# Patient Record
Sex: FEMALE | Race: WHITE | Employment: UNEMPLOYED | URBAN - METROPOLITAN AREA
[De-identification: names, ages, dates, MRNs, and addresses within clinical notes are randomized per-mention and may not be internally consistent; named-entity substitution may affect disease eponyms.]

---

## 2017-04-28 ENCOUNTER — HOSPITAL ENCOUNTER (OUTPATIENT)
Dept: INFUSION THERAPY | Age: 82
Discharge: HOME | End: 2017-04-28
Payer: MEDICARE

## 2017-04-28 VITALS
WEIGHT: 151 LBS | TEMPERATURE: 97.1 F | SYSTOLIC BLOOD PRESSURE: 107 MMHG | BODY MASS INDEX: 25.13 KG/M2 | DIASTOLIC BLOOD PRESSURE: 58 MMHG | RESPIRATION RATE: 18 BRPM | HEART RATE: 64 BPM

## 2017-04-28 PROCEDURE — 96365 THER/PROPH/DIAG IV INF INIT: CPT

## 2017-04-28 PROCEDURE — 74011000250 HC RX REV CODE- 250: Performed by: INTERNAL MEDICINE

## 2017-04-28 PROCEDURE — 96366 THER/PROPH/DIAG IV INF ADDON: CPT

## 2017-04-28 PROCEDURE — 74011000258 HC RX REV CODE- 258: Performed by: INTERNAL MEDICINE

## 2017-04-28 RX ADMIN — SODIUM BICARBONATE: 84 INJECTION, SOLUTION INTRAVENOUS at 11:41

## 2017-04-28 RX ADMIN — SODIUM BICARBONATE: 84 INJECTION, SOLUTION INTRAVENOUS at 13:08

## 2017-04-28 NOTE — PROGRESS NOTES
Problem: Knowledge Deficit  Goal: *Verbalizes understanding of procedures and medications  Outcome: Resolved/Met Date Met:  04/28/17  Process

## 2017-04-28 NOTE — PROGRESS NOTES
[facility] Note:  1100Pt arrived at [facility] ambulatory and in no distress for hydration over 4 hr. Assessment stable, no  complaints voiced. Medications received:  *1/2 NS with 75 meq sodium bicarb    1600 Tolerated treatment well, no adverse reaction noted. D/Cd from [facility] ambulatory and in no distress accompanied by daughter. Next appt none  Visit Vitals    /58    Pulse 64    Temp 97.1 °F (36.2 °C)    Resp 18    Wt 68.5 kg (151 lb)    BMI 25.13 kg/m2     No results found for this or any previous visit (from the past 12 hour(s)).

## 2018-07-09 ENCOUNTER — APPOINTMENT (OUTPATIENT)
Dept: ULTRASOUND IMAGING | Age: 83
DRG: 445 | End: 2018-07-09
Attending: EMERGENCY MEDICINE
Payer: MEDICARE

## 2018-07-09 ENCOUNTER — HOSPITAL ENCOUNTER (INPATIENT)
Age: 83
LOS: 1 days | Discharge: HOME | DRG: 445 | End: 2018-07-10
Attending: EMERGENCY MEDICINE | Admitting: INTERNAL MEDICINE
Payer: MEDICARE

## 2018-07-09 ENCOUNTER — APPOINTMENT (OUTPATIENT)
Dept: MRI IMAGING | Age: 83
DRG: 445 | End: 2018-07-09
Attending: INTERNAL MEDICINE
Payer: MEDICARE

## 2018-07-09 ENCOUNTER — APPOINTMENT (OUTPATIENT)
Dept: GENERAL RADIOLOGY | Age: 83
DRG: 445 | End: 2018-07-09
Attending: EMERGENCY MEDICINE
Payer: MEDICARE

## 2018-07-09 ENCOUNTER — APPOINTMENT (OUTPATIENT)
Dept: CT IMAGING | Age: 83
DRG: 445 | End: 2018-07-09
Attending: INTERNAL MEDICINE
Payer: MEDICARE

## 2018-07-09 DIAGNOSIS — R11.2 NON-INTRACTABLE VOMITING WITH NAUSEA, UNSPECIFIED VOMITING TYPE: Primary | ICD-10-CM

## 2018-07-09 DIAGNOSIS — R74.02 ELEVATED SERUM LACTATE DEHYDROGENASE: ICD-10-CM

## 2018-07-09 DIAGNOSIS — R68.83 CHILLS (WITHOUT FEVER): ICD-10-CM

## 2018-07-09 PROBLEM — K83.8 DILATION OF BILIARY TRACT: Status: ACTIVE | Noted: 2018-07-09

## 2018-07-09 LAB
ALBUMIN SERPL-MCNC: 3.6 G/DL (ref 3.5–5)
ALBUMIN/GLOB SERPL: 0.9 {RATIO} (ref 1.1–2.2)
ALP SERPL-CCNC: 201 U/L (ref 45–117)
ALT SERPL-CCNC: 71 U/L (ref 12–78)
ANION GAP SERPL CALC-SCNC: 11 MMOL/L (ref 5–15)
APPEARANCE UR: CLEAR
AST SERPL-CCNC: 54 U/L (ref 15–37)
BACTERIA URNS QL MICRO: NEGATIVE /HPF
BASOPHILS # BLD: 0.1 K/UL (ref 0–0.1)
BASOPHILS NFR BLD: 1 % (ref 0–1)
BILIRUB SERPL-MCNC: 0.5 MG/DL (ref 0.2–1)
BILIRUB UR QL: NEGATIVE
BUN SERPL-MCNC: 21 MG/DL (ref 6–20)
BUN/CREAT SERPL: 14 (ref 12–20)
CALCIUM SERPL-MCNC: 9.2 MG/DL (ref 8.5–10.1)
CHLORIDE SERPL-SCNC: 108 MMOL/L (ref 97–108)
CO2 SERPL-SCNC: 20 MMOL/L (ref 21–32)
COLOR UR: ABNORMAL
CREAT SERPL-MCNC: 1.55 MG/DL (ref 0.55–1.02)
DIFFERENTIAL METHOD BLD: ABNORMAL
EOSINOPHIL # BLD: 0.1 K/UL (ref 0–0.4)
EOSINOPHIL NFR BLD: 2 % (ref 0–7)
EPITH CASTS URNS QL MICRO: ABNORMAL /LPF
ERYTHROCYTE [DISTWIDTH] IN BLOOD BY AUTOMATED COUNT: 12.9 % (ref 11.5–14.5)
GLOBULIN SER CALC-MCNC: 4.2 G/DL (ref 2–4)
GLUCOSE SERPL-MCNC: 150 MG/DL (ref 65–100)
GLUCOSE UR STRIP.AUTO-MCNC: 250 MG/DL
HCT VFR BLD AUTO: 42.1 % (ref 35–47)
HGB BLD-MCNC: 13.8 G/DL (ref 11.5–16)
HGB UR QL STRIP: ABNORMAL
IMM GRANULOCYTES # BLD: 0 K/UL (ref 0–0.04)
IMM GRANULOCYTES NFR BLD AUTO: 0 % (ref 0–0.5)
KETONES UR QL STRIP.AUTO: NEGATIVE MG/DL
LACTATE SERPL-SCNC: 1.8 MMOL/L (ref 0.4–2)
LACTATE SERPL-SCNC: 2.8 MMOL/L (ref 0.4–2)
LEUKOCYTE ESTERASE UR QL STRIP.AUTO: NEGATIVE
LIPASE SERPL-CCNC: 247 U/L (ref 73–393)
LYMPHOCYTES # BLD: 0.8 K/UL (ref 0.8–3.5)
LYMPHOCYTES NFR BLD: 12 % (ref 12–49)
MCH RBC QN AUTO: 32.9 PG (ref 26–34)
MCHC RBC AUTO-ENTMCNC: 32.8 G/DL (ref 30–36.5)
MCV RBC AUTO: 100.2 FL (ref 80–99)
MONOCYTES # BLD: 0.5 K/UL (ref 0–1)
MONOCYTES NFR BLD: 7 % (ref 5–13)
NEUTS SEG # BLD: 5.2 K/UL (ref 1.8–8)
NEUTS SEG NFR BLD: 78 % (ref 32–75)
NITRITE UR QL STRIP.AUTO: NEGATIVE
NRBC # BLD: 0 K/UL (ref 0–0.01)
NRBC BLD-RTO: 0 PER 100 WBC
PH UR STRIP: 5.5 [PH] (ref 5–8)
PLATELET # BLD AUTO: 216 K/UL (ref 150–400)
PMV BLD AUTO: 10 FL (ref 8.9–12.9)
POTASSIUM SERPL-SCNC: 4.2 MMOL/L (ref 3.5–5.1)
PROT SERPL-MCNC: 7.8 G/DL (ref 6.4–8.2)
PROT UR STRIP-MCNC: 100 MG/DL
RBC # BLD AUTO: 4.2 M/UL (ref 3.8–5.2)
RBC #/AREA URNS HPF: ABNORMAL /HPF (ref 0–5)
SODIUM SERPL-SCNC: 139 MMOL/L (ref 136–145)
SP GR UR REFRACTOMETRY: 1.01 (ref 1–1.03)
TROPONIN I SERPL-MCNC: <0.05 NG/ML
UA: UC IF INDICATED,UAUC: ABNORMAL
UROBILINOGEN UR QL STRIP.AUTO: 0.2 EU/DL (ref 0.2–1)
WBC # BLD AUTO: 6.7 K/UL (ref 3.6–11)
WBC URNS QL MICRO: ABNORMAL /HPF (ref 0–4)

## 2018-07-09 PROCEDURE — 36415 COLL VENOUS BLD VENIPUNCTURE: CPT | Performed by: EMERGENCY MEDICINE

## 2018-07-09 PROCEDURE — 65270000032 HC RM SEMIPRIVATE

## 2018-07-09 PROCEDURE — 76705 ECHO EXAM OF ABDOMEN: CPT

## 2018-07-09 PROCEDURE — 81001 URINALYSIS AUTO W/SCOPE: CPT | Performed by: EMERGENCY MEDICINE

## 2018-07-09 PROCEDURE — 99285 EMERGENCY DEPT VISIT HI MDM: CPT

## 2018-07-09 PROCEDURE — 96374 THER/PROPH/DIAG INJ IV PUSH: CPT

## 2018-07-09 PROCEDURE — 83690 ASSAY OF LIPASE: CPT | Performed by: EMERGENCY MEDICINE

## 2018-07-09 PROCEDURE — 85025 COMPLETE CBC W/AUTO DIFF WBC: CPT | Performed by: EMERGENCY MEDICINE

## 2018-07-09 PROCEDURE — 87086 URINE CULTURE/COLONY COUNT: CPT | Performed by: EMERGENCY MEDICINE

## 2018-07-09 PROCEDURE — 87040 BLOOD CULTURE FOR BACTERIA: CPT | Performed by: EMERGENCY MEDICINE

## 2018-07-09 PROCEDURE — 93005 ELECTROCARDIOGRAM TRACING: CPT

## 2018-07-09 PROCEDURE — 83605 ASSAY OF LACTIC ACID: CPT | Performed by: EMERGENCY MEDICINE

## 2018-07-09 PROCEDURE — 74011250637 HC RX REV CODE- 250/637: Performed by: INTERNAL MEDICINE

## 2018-07-09 PROCEDURE — 74176 CT ABD & PELVIS W/O CONTRAST: CPT

## 2018-07-09 PROCEDURE — 74011250636 HC RX REV CODE- 250/636: Performed by: EMERGENCY MEDICINE

## 2018-07-09 PROCEDURE — 84484 ASSAY OF TROPONIN QUANT: CPT | Performed by: EMERGENCY MEDICINE

## 2018-07-09 PROCEDURE — 96361 HYDRATE IV INFUSION ADD-ON: CPT

## 2018-07-09 PROCEDURE — 71046 X-RAY EXAM CHEST 2 VIEWS: CPT

## 2018-07-09 PROCEDURE — 80053 COMPREHEN METABOLIC PANEL: CPT | Performed by: EMERGENCY MEDICINE

## 2018-07-09 RX ORDER — ONDANSETRON 2 MG/ML
4 INJECTION INTRAMUSCULAR; INTRAVENOUS
Status: COMPLETED | OUTPATIENT
Start: 2018-07-09 | End: 2018-07-09

## 2018-07-09 RX ORDER — LOPERAMIDE HYDROCHLORIDE 2 MG/1
2 CAPSULE ORAL AS NEEDED
Status: DISCONTINUED | OUTPATIENT
Start: 2018-07-09 | End: 2018-07-10 | Stop reason: HOSPADM

## 2018-07-09 RX ORDER — PANTOPRAZOLE SODIUM 40 MG/1
40 TABLET, DELAYED RELEASE ORAL
Status: DISCONTINUED | OUTPATIENT
Start: 2018-07-10 | End: 2018-07-10 | Stop reason: HOSPADM

## 2018-07-09 RX ORDER — ENOXAPARIN SODIUM 100 MG/ML
40 INJECTION SUBCUTANEOUS EVERY 24 HOURS
Status: DISCONTINUED | OUTPATIENT
Start: 2018-07-09 | End: 2018-07-09 | Stop reason: DRUGHIGH

## 2018-07-09 RX ORDER — CETIRIZINE HCL 10 MG
10 TABLET ORAL DAILY
COMMUNITY

## 2018-07-09 RX ORDER — SODIUM CHLORIDE 9 MG/ML
500 INJECTION, SOLUTION INTRAVENOUS CONTINUOUS
Status: DISCONTINUED | OUTPATIENT
Start: 2018-07-09 | End: 2018-07-09

## 2018-07-09 RX ORDER — AMLODIPINE BESYLATE 5 MG/1
5 TABLET ORAL DAILY
Status: DISCONTINUED | OUTPATIENT
Start: 2018-07-10 | End: 2018-07-10 | Stop reason: HOSPADM

## 2018-07-09 RX ORDER — CYCLOBENZAPRINE HCL 10 MG
5 TABLET ORAL
Status: DISCONTINUED | OUTPATIENT
Start: 2018-07-09 | End: 2018-07-10 | Stop reason: HOSPADM

## 2018-07-09 RX ORDER — THERA TABS 400 MCG
1 TAB ORAL DAILY
Status: DISCONTINUED | OUTPATIENT
Start: 2018-07-10 | End: 2018-07-10 | Stop reason: HOSPADM

## 2018-07-09 RX ORDER — MEMANTINE HYDROCHLORIDE 10 MG/1
10 TABLET ORAL 2 TIMES DAILY
Status: DISCONTINUED | OUTPATIENT
Start: 2018-07-09 | End: 2018-07-10 | Stop reason: HOSPADM

## 2018-07-09 RX ORDER — LOPERAMIDE HYDROCHLORIDE 2 MG/1
2 CAPSULE ORAL AS NEEDED
COMMUNITY

## 2018-07-09 RX ORDER — ENOXAPARIN SODIUM 100 MG/ML
30 INJECTION SUBCUTANEOUS EVERY 24 HOURS
Status: DISCONTINUED | OUTPATIENT
Start: 2018-07-09 | End: 2018-07-10 | Stop reason: HOSPADM

## 2018-07-09 RX ORDER — LEVOTHYROXINE SODIUM 88 UG/1
88 TABLET ORAL
Status: DISCONTINUED | OUTPATIENT
Start: 2018-07-10 | End: 2018-07-10 | Stop reason: HOSPADM

## 2018-07-09 RX ORDER — ACETAMINOPHEN 325 MG/1
650 TABLET ORAL
Status: DISCONTINUED | OUTPATIENT
Start: 2018-07-09 | End: 2018-07-10 | Stop reason: HOSPADM

## 2018-07-09 RX ORDER — OMEPRAZOLE 20 MG/1
20 CAPSULE, DELAYED RELEASE ORAL DAILY
Status: DISCONTINUED | OUTPATIENT
Start: 2018-07-10 | End: 2018-07-09 | Stop reason: CLARIF

## 2018-07-09 RX ORDER — CLONIDINE HYDROCHLORIDE 0.1 MG/1
0.1 TABLET ORAL
Status: DISCONTINUED | OUTPATIENT
Start: 2018-07-09 | End: 2018-07-10 | Stop reason: HOSPADM

## 2018-07-09 RX ORDER — ONDANSETRON 2 MG/ML
4 INJECTION INTRAMUSCULAR; INTRAVENOUS
Status: DISCONTINUED | OUTPATIENT
Start: 2018-07-09 | End: 2018-07-10 | Stop reason: HOSPADM

## 2018-07-09 RX ORDER — DONEPEZIL HYDROCHLORIDE 10 MG/1
10 TABLET, FILM COATED ORAL
Status: DISCONTINUED | OUTPATIENT
Start: 2018-07-09 | End: 2018-07-10 | Stop reason: HOSPADM

## 2018-07-09 RX ORDER — FEBUXOSTAT 40 MG/1
40 TABLET, FILM COATED ORAL DAILY
Status: DISCONTINUED | OUTPATIENT
Start: 2018-07-10 | End: 2018-07-10 | Stop reason: HOSPADM

## 2018-07-09 RX ORDER — SODIUM CHLORIDE 9 MG/ML
75 INJECTION, SOLUTION INTRAVENOUS CONTINUOUS
Status: DISCONTINUED | OUTPATIENT
Start: 2018-07-09 | End: 2018-07-10 | Stop reason: HOSPADM

## 2018-07-09 RX ORDER — CETIRIZINE HCL 10 MG
10 TABLET ORAL DAILY
Status: DISCONTINUED | OUTPATIENT
Start: 2018-07-10 | End: 2018-07-10 | Stop reason: HOSPADM

## 2018-07-09 RX ADMIN — DONEPEZIL HYDROCHLORIDE 10 MG: 10 TABLET, FILM COATED ORAL at 21:23

## 2018-07-09 RX ADMIN — ONDANSETRON 4 MG: 2 INJECTION INTRAMUSCULAR; INTRAVENOUS at 08:57

## 2018-07-09 RX ADMIN — CLONIDINE HYDROCHLORIDE 0.1 MG: 0.1 TABLET ORAL at 11:57

## 2018-07-09 RX ADMIN — SODIUM CHLORIDE 75 ML/HR: 900 INJECTION, SOLUTION INTRAVENOUS at 10:25

## 2018-07-09 RX ADMIN — MEMANTINE HYDROCHLORIDE 10 MG: 10 TABLET ORAL at 17:33

## 2018-07-09 RX ADMIN — SODIUM CHLORIDE 500 ML: 900 INJECTION, SOLUTION INTRAVENOUS at 09:40

## 2018-07-09 NOTE — PROGRESS NOTES
Admission Medication Reconciliation:    Comments/Recommendations: This medication history was obtained from family members and medication list; she appears to be poor historian. An RX Query is available. Medications added: Immodium 2mg prn, Zyrtec 10mg daily  Medications deleted: Chlorpheniramine 4mg   Medications amended: none    Last doses of medication: 7/8/18      Significant PMH/Disease States:   Past Medical History:   Diagnosis Date    Alzheimer's disease 7/19/2016    Anemia 10/10/2013    Asthma     CHILDHOOD    Cataracts, both eyes     Colon cancer ([facility] [address] 7/09    Partial colectomy & chemo    Essential hypertension, benign 11/12/2010    Gout     Grave's disease     s/p LAZAR ablation.  Hypertension     Hypothyroidism 6/13/2011    Nephritis and nephropathy, not specified as acute or chronic, with unspecified pathological lesion in kidney     H/O -- Unclear details    Osteoporosis     Ulcerative colitis        Chief Complaint for this Admission:  Abdominal Pain    Allergies:  Actonel [risedronate]; Codeine; Pcn [penicillins]; and Sulfa (sulfonamide antibiotics)    Prior to Admission Medications:   Prior to Admission Medications   Prescriptions Last Dose Informant Patient Reported? Taking? amLODIPine (NORVASC) 5 mg tablet   Yes Yes   Sig: Take 5 mg by mouth daily. cetirizine (ZYRTEC) 10 mg tablet   Yes Yes   Sig: Take 10 mg by mouth daily. cyclobenzaprine (FLEXERIL) 5 mg tablet   No Yes   Sig: take 1 to 2 tablets by mouth three times a day if needed for MUSCLE SPASMS   donepezil (ARICEPT) 10 mg tablet   No Yes   Sig: TAKE 1 TABLET EACH NIGHT   febuxostat (ULORIC) 40 mg tab tablet   Yes Yes   Sig: Take 40 mg by mouth daily. levothyroxine (SYNTHROID) 88 mcg tablet   Yes Yes   Sig: Take 88 mcg by mouth Daily (before breakfast).    loperamide (IMODIUM) 2 mg capsule   Yes Yes   Sig: Take 2 mg by mouth as needed for Diarrhea.   memantine ER (NAMENDA XR) 28 mg capsule   No Yes   Sig: Take 1 Cap by mouth daily. multivitamin (MULTIPLE VITAMIN) tablet   Yes Yes   Sig: Take  by mouth. Takes one po once daily if remembered. omeprazole (PRILOSEC) 20 mg capsule   No Yes   Sig: Take 1 Cap by mouth daily. Indications: Gastric Ulcer      Facility-Administered Medications: None         Thank you for allowing me to participate in the care of this patient. Please contact the pharmacy () or the medication reconciliation pharmacy () with any questions.   Mike Roach  PharmD Candidate [id number]

## 2018-07-09 NOTE — ED PROVIDER NOTES
HPI Comments: 82 y.o. female with past medical history significant for hypertension, colon cancer, ulcerative colitis, Grave's disease, hypothyroidism, and dementia who presents, accompanied by , with chief complaint of abdominal pain. Per , patient complained of epigastric pain and bilateral leg pain last night. Patient states pain is non-radiating, does not penetrate through to her back and has been constant until this morning. Patient woke up this morning with the same abdominal pain and leg pain, but currently denies any pain at this time. Patient states both legs feel \"a little numb. \"  Patient admits to not feeling hungry this morning. Per , patient has had a slight cough and this morning she had shaking chills, which prompted their visit to ED. No known fever. Patient denies vomiting, diarrhea, constipation, urinary symptoms, leg swelling, chest pain, shortness of breath. No urinary symptoms noted. There are no other acute medical concerns at this time. Social hx: , lives at home with   PCP: Eri Mario MD    Note written by Taryn Pink. Kings Sweeney, as dictated by Celio Church MD 8:14 AM      The history is provided by the patient and the spouse. Past Medical History:   Diagnosis Date    Alzheimer's disease 7/19/2016    Anemia 10/10/2013    Asthma     CHILDHOOD    Cataracts, both eyes     [facility]    Partial colectomy & chemo    Essential hypertension, benign 11/12/2010    Gout     Grave's disease     s/p LAZAR ablation.     Hypertension     Hypothyroidism 6/13/2011    Nephritis and nephropathy, not specified as acute or chronic, with unspecified pathological lesion in kidney     H/O -- Unclear details    Osteoporosis     Ulcerative colitis        Past Surgical History:   Procedure Laterality Date    AMB POC GI TRACT CAPSULE ENDOSCOPY  10/31/2016         COLONOSCOPY N/A 8/24/2016    COLONOSCOPY performed [facility]egino [facility] at [facility] ENDOSCOPY    HX COLECTOMY  7 09    colon ca    HX GI      COLONOSCOPY YEARLY    HX HEENT      WISDOM TEETH    HX TONSILLECTOMY      UPPER GI ENDOSCOPY,BIOPSY  10/26/2016              Family History:   Problem Relation Age of Onset    Hypertension Father     Stroke Father     Cancer Father      prostate    Kidney Disease Other      \"nephritis\"       Social History     Social History    Marital status:      Spouse name: N/A    Number of children: N/A    Years of education: N/A     Occupational History    Not on file. Social History Main Topics    Smoking status: Former Smoker     Packs/day: 0.25     Years: 20.00     Quit date: 1/1/1984    Smokeless tobacco: Never Used    Alcohol use Yes      Comment: OCCASIONAL WINE    Drug use: No    Sexual activity: Not on file     Other Topics Concern    Not on file     Social History Narrative         ALLERGIES: Actonel [risedronate]; Codeine; Pcn [penicillins]; and Sulfa (sulfonamide antibiotics)    Review of Systems   Constitutional: Positive for appetite change and chills. Negative for activity change and fatigue. HENT: Negative for ear pain, facial swelling, sore throat and trouble swallowing. Eyes: Negative for pain, discharge and visual disturbance. Respiratory: Negative for chest tightness, shortness of breath and wheezing. Cardiovascular: Negative for chest pain and palpitations. Gastrointestinal: Positive for abdominal pain. Negative for blood in stool, nausea and vomiting. Genitourinary: Negative for difficulty urinating, flank pain and hematuria. Musculoskeletal: Negative for arthralgias, joint swelling, myalgias and neck pain. Skin: Negative for color change and rash. Neurological: Negative for dizziness, weakness and headaches. Hematological: Negative for adenopathy. Does not bruise/bleed easily. Psychiatric/Behavioral: Negative for behavioral problems, confusion and sleep disturbance. All other systems reviewed and are negative. Vitals:    07/09/18 0801   BP: 191/80   Pulse: 72   Resp: 14   Temp: 97.9 °F (36.6 °C)   SpO2: 99%   Weight: 68 kg (150 lb)   Height: 5' 5\" (1.651 m)            Physical Exam   Constitutional: She is oriented to person, place, and time. She appears well-developed and well-nourished. No distress. HENT:   Head: Normocephalic and atraumatic. Nose: Nose normal.   Mouth/Throat: Oropharynx is clear and moist.   Eyes: Conjunctivae and EOM are normal. Pupils are equal, round, and reactive to light. No scleral icterus. Neck: Normal range of motion. Neck supple. No JVD present. No tracheal deviation present. No thyromegaly present. No carotid bruits noted. Cardiovascular: Normal rate, regular rhythm, normal heart sounds and intact distal pulses. Exam reveals no gallop and no friction rub. No murmur heard. Pulmonary/Chest: Effort normal and breath sounds normal. No respiratory distress. She has no wheezes. She has no rales. She exhibits no tenderness. Abdominal: Soft. Bowel sounds are normal. She exhibits no distension and no mass. There is no tenderness. There is no rebound and no guarding. Musculoskeletal: Normal range of motion. She exhibits no edema or tenderness. Lymphadenopathy:     She has no cervical adenopathy. Neurological: She is alert and oriented to person, place, and time. She has normal reflexes. No cranial nerve deficit. Coordination normal.   Skin: Skin is warm and dry. No rash noted. No erythema. Psychiatric: She has a normal mood and affect. Her behavior is normal. Judgment and thought content normal.   Nursing note and vitals reviewed. Note written by Braden Mcbride.  Javi Dixon, as dictated by Ana Sandoval MD 8:20 AM       MDM  Number of Diagnoses or Management Options  Chills (without fever): new and requires workup  Elevated serum lactate dehydrogenase: new and requires workup  Non-intractable vomiting with nausea, unspecified vomiting type: new and requires workup     Amount and/or Complexity of Data Reviewed  Clinical lab tests: ordered and reviewed  Tests in the radiology section of CPT®: ordered and reviewed  Decide to obtain previous medical records or to obtain history from someone other than the patient: yes  Review and summarize past medical records: yes  Discuss the patient with other providers: yes  Independent visualization of images, tracings, or specimens: yes    Risk of Complications, Morbidity, and/or Mortality  Presenting problems: high  Diagnostic procedures: high  Management options: high    Patient Progress  Patient progress: stable        ED Course       Procedures    ED EKG interpretation:  Normal sinus rhythm, 72 bpm, normal axis, RBBB, no ectopy, no acute change. Note written by Fátima Garcia. Gigi Harvey, as dictated by Katherine Bloom MD 8:19 AM    9:15 AM  LFTs slightly elevated; US ordered. 10:35 AM  US unremarkable. Consult placed for Dr. Gena Mcdowell. CONSULT NOTE:  10:45 AM Katherine Bloom MD spoke with Dr. Gena Mcdowell, Consult for PCP. Discussed available diagnostic tests and clinical findings. Dr. Gena Mcdowell will admit patient and will order MRCP.

## 2018-07-09 NOTE — PROGRESS NOTES
Bedside shift change report given to [address] [place] [address] Hardy (oncoming nurse) by Tara Parra (offgoing nurse). Report included the following information SBAR, Kardex and MAR.

## 2018-07-09 NOTE — ED TRIAGE NOTES
Patient reports to the ED via EMS from home reporting diffuse abdominal pain. Per EMS, patient has dementia, poor historian. Denies nausea, vomiting, diarrhea, constipation.

## 2018-07-09 NOTE — PROGRESS NOTES
Lovenox Monitoring  Indication: DVT Prophylaxis  Recent Labs      07/09/18   0807   HGB  13.8   PLT  216   CREA  1.55*     Current Weight: 68 kg  Est. CrCl = 25.6 ml/min  Current Dose: 40 mg subcutaneously every 24 hours. Plan: Change to Lovenox 30 mg SC Q24H for CrCl < 30 ml/min per hospital renal dosing protocol.

## 2018-07-09 NOTE — H&P
History and Physical    Subjective:     Irma Menendez is a 82 y.o. white female with some dementia, and she lived at home with her . Pt woke up at 3 am today with c/o upper abd pain. She does not really remember this though (dementia). She c/o some nausea. In the ER, she had a vomiting episode. Labs show borderline LFT's and lactate 2.8, and an U/s was done -- some borderline biliary dilatation, including in the pancreas. Pt is being admitted for the above. There were no c/o's CP/SOB. She currently seems to feel fine. Past Medical History:   Diagnosis Date    Alzheimer's disease 7/19/2016    Anemia 10/10/2013    Asthma     CHILDHOOD    Cataracts, both eyes     Colon cancer ([facility] [address] 7/09    Partial colectomy & chemo    Essential hypertension, benign 11/12/2010    Gout     Grave's disease     s/p LAZAR ablation.  Hypertension     Hypothyroidism 6/13/2011    Nephritis and nephropathy, not specified as acute or chronic, with unspecified pathological lesion in kidney     H/O -- Unclear details    Osteoporosis     Ulcerative colitis      Allergies   Allergen Reactions    Actonel [Risedronate] Other (comments)     SEVERE HEARTBURN    Codeine Unknown (comments)     Unsure but thinks it is nausea.  Pcn [Penicillins] Nausea and Vomiting    Sulfa (Sulfonamide Antibiotics) Nausea and Vomiting     Prior to Admission medications    Medication Sig Start Date End Date Taking? Authorizing Provider   cetirizine (ZYRTEC) 10 mg tablet Take 10 mg by mouth daily. Yes Historical Provider   loperamide (IMODIUM) 2 mg capsule Take 2 mg by mouth as needed for Diarrhea. Yes Historical Provider   amLODIPine (NORVASC) 5 mg tablet Take 5 mg by mouth daily. Yes Historical Provider   febuxostat (ULORIC) 40 mg tab tablet Take 40 mg by mouth daily.    Yes Historical Provider   cyclobenzaprine (FLEXERIL) 5 mg tablet take 1 to 2 tablets by mouth three times a day if needed for MUSCLE SPASMS 11/16/16  Yes HALLIE Michal Councilman, MD   omeprazole (PRILOSEC) 20 mg capsule Take 1 Cap by mouth daily. Indications: Gastric Ulcer 10/26/16  Yes Reji Morse MD   levothyroxine (SYNTHROID) 88 mcg tablet Take 88 mcg by mouth Daily (before breakfast). Yes Historical Provider   memantine ER (NAMENDA XR) 28 mg capsule Take 1 Cap by mouth daily. 8/22/16  Yes Michal Councilman, MD   donepezil (ARICEPT) 10 mg tablet TAKE 1 TABLET EACH NIGHT 6/8/16  Yes Michal Councilman, MD   multivitamin (MULTIPLE VITAMIN) tablet Take  by mouth. Takes one po once daily if remembered. Yes Historical Provider     Social History   Substance Use Topics    Smoking status: Former Smoker     Packs/day: 0.25     Years: 20.00     Quit date: 1/1/1984    Smokeless tobacco: Never Used    Alcohol use Yes      Comment: OCCASIONAL WINE     Family History   Problem Relation Age of Onset    Hypertension Father     Stroke Father     Cancer Father      prostate    Kidney Disease Other      \"nephritis\"               Review of Systems:  As above. Objective:       Physical Exam:   In NAD. HEENT -- Pupils round. O/P Clear. Neck -- Supple. No JVD. Heart -- RRR. No R/M/G. Lungs -- CTA. Abdomen -- Soft. Non-tender. Non-distended. No masses. Bowel sounds present. Extremeties -- No edema.         Data Review:   Recent Results (from the past 24 hour(s))   EKG, 12 LEAD, INITIAL    Collection Time: 07/09/18  8:01 AM   Result Value Ref Range    Ventricular Rate 72 BPM    Atrial Rate 72 BPM    P-R Interval 170 ms    QRS Duration 140 ms    Q-T Interval 452 ms    QTC Calculation (Bezet) 494 ms    Calculated P Axis 70 degrees    Calculated R Axis 70 degrees    Calculated T Axis 48 degrees    Diagnosis       Normal sinus rhythm  Right bundle branch block  When compared with ECG of 01-JUL-2009 09:52,  Previous ECG has undetermined rhythm, needs review     CBC WITH AUTOMATED DIFF    Collection Time: 07/09/18  8:07 AM   Result Value Ref Range WBC 6.7 3.6 - 11.0 K/uL    RBC 4.20 3.80 - 5.20 M/uL    HGB 13.8 11.5 - 16.0 g/dL    HCT 42.1 35.0 - 47.0 %    .2 (H) 80.0 - 99.0 FL    MCH 32.9 26.0 - 34.0 PG    MCHC 32.8 30.0 - 36.5 g/dL    RDW 12.9 11.5 - 14.5 %    PLATELET 786 571 - 989 K/uL    MPV 10.0 8.9 - 12.9 FL    NRBC 0.0 0  WBC    ABSOLUTE NRBC 0.00 0.00 - 0.01 K/uL    NEUTROPHILS 78 (H) 32 - 75 %    LYMPHOCYTES 12 12 - 49 %    MONOCYTES 7 5 - 13 %    EOSINOPHILS 2 0 - 7 %    BASOPHILS 1 0 - 1 %    IMMATURE GRANULOCYTES 0 0.0 - 0.5 %    ABS. NEUTROPHILS 5.2 1.8 - 8.0 K/UL    ABS. LYMPHOCYTES 0.8 0.8 - 3.5 K/UL    ABS. MONOCYTES 0.5 0.0 - 1.0 K/UL    ABS. EOSINOPHILS 0.1 0.0 - 0.4 K/UL    ABS. BASOPHILS 0.1 0.0 - 0.1 K/UL    ABS. IMM. GRANS. 0.0 0.00 - 0.04 K/UL    DF AUTOMATED     METABOLIC PANEL, COMPREHENSIVE    Collection Time: 07/09/18  8:07 AM   Result Value Ref Range    Sodium 139 136 - 145 mmol/L    Potassium 4.2 3.5 - 5.1 mmol/L    Chloride 108 97 - 108 mmol/L    CO2 20 (L) 21 - 32 mmol/L    Anion gap 11 5 - 15 mmol/L    Glucose 150 (H) 65 - 100 mg/dL    BUN 21 (H) 6 - 20 MG/DL    Creatinine 1.55 (H) 0.55 - 1.02 MG/DL    BUN/Creatinine ratio 14 12 - 20      GFR est AA 39 (L) >60 ml/min/1.73m2    GFR est non-AA 32 (L) >60 ml/min/1.73m2    Calcium 9.2 8.5 - 10.1 MG/DL    Bilirubin, total 0.5 0.2 - 1.0 MG/DL    ALT (SGPT) 71 12 - 78 U/L    AST (SGOT) 54 (H) 15 - 37 U/L    Alk.  phosphatase 201 (H) 45 - 117 U/L    Protein, total 7.8 6.4 - 8.2 g/dL    Albumin 3.6 3.5 - 5.0 g/dL    Globulin 4.2 (H) 2.0 - 4.0 g/dL    A-G Ratio 0.9 (L) 1.1 - 2.2     TROPONIN I    Collection Time: 07/09/18  8:07 AM   Result Value Ref Range    Troponin-I, Qt. <0.05 <0.05 ng/mL   LIPASE    Collection Time: 07/09/18  8:07 AM   Result Value Ref Range    Lipase 247 73 - 393 U/L   URINALYSIS W/ REFLEX CULTURE    Collection Time: 07/09/18  8:20 AM   Result Value Ref Range    Color YELLOW/STRAW      Appearance CLEAR CLEAR      Specific gravity 1.013 1.003 - 1.030 pH (UA) 5.5 5.0 - 8.0      Protein 100 (A) NEG mg/dL    Glucose 250 (A) NEG mg/dL    Ketone NEGATIVE  NEG mg/dL    Bilirubin NEGATIVE  NEG      Blood TRACE (A) NEG      Urobilinogen 0.2 0.2 - 1.0 EU/dL    Nitrites NEGATIVE  NEG      Leukocyte Esterase NEGATIVE  NEG      WBC 5-10 0 - 4 /hpf    RBC 0-5 0 - 5 /hpf    Epithelial cells MODERATE (A) FEW /lpf    Bacteria NEGATIVE  NEG /hpf    UA:UC IF INDICATED URINE CULTURE ORDERED (A) CNI     LACTIC ACID    Collection Time: 07/09/18  8:39 AM   Result Value Ref Range    Lactic acid 2.8 (HH) 0.4 - 2.0 MMOL/L   LACTIC ACID    Collection Time: 07/09/18 11:22 AM   Result Value Ref Range    Lactic acid 1.8 0.4 - 2.0 MMOL/L           Assessment:     Principal Problem:    Dilation of biliary tract (7/9/2018) -- ? Significance? Active Problems:    Essential hypertension, benign (11/12/2010)      Hypothyroidism due to acquired atrophy of thyroid (7/19/2016)      Nausea & vomiting (7/9/2018)        Plan:     1. IVF. 2.  MRCP. 3.  Recheck labs in am.  4.  Continue other home medications as before. D/w dtr, Arielle Roger, present.       Signed By: Maggi Melo MD     July 9, 2018

## 2018-07-09 NOTE — H&P
Pt with N/v & some mildly dilated biliary/pancreatic ducts on U/S. Slightly elevated lactate, but WBC nl, no fever. Admit, IVF, MRCP. I will see pt later today -- Full H&P to follow.

## 2018-07-09 NOTE — IP AVS SNAPSHOT
[address] 
[phone] Patient: Norman Zambrano MRN: [id number] You are allergic to the following Allergen Reactions Actonel (Risedronate) Other (comments) SEVERE HEARTBURN Codeine Unknown (comments) Unsure but thinks it is nausea. Pcn (Penicillins) Nausea and Vomiting  
    
 Sulfa (Sulfonamide Antibiotics) Nausea and Vomiting Recent Documentation Height Weight Breastfeeding? BMI OB Status Smoking Status 1.651 m 69.3 kg No 25.42 kg/m2 Postmenopausal Former Smoker Unresulted Labs-Please follow up with your PCP about these lab tests Order Current Status CULTURE, BLOOD, PAIRED Preliminary result Emergency Contacts  (Rel.) Home Phone Work Phone Mobile Phone Joycelyn Gary) [phone] -- --  
 [facility] 26 [id number] -- [phone] Rose Mary Sr (Daughter) [phone] -- [phone] About your hospitalization You were admitted on:  July 9, 2018 You last received care in the:  [facility] You were discharged on:  July 10, 2018 Why you were hospitalized Your primary diagnosis was:  Dilation Of Biliary Tract Your diagnoses also included:  Nausea & Vomiting, Essential Hypertension, Benign, Hypothyroidism Due To Acquired Atrophy Of Thyroid Providers Seen During Your Hospitalization Provider Specialty Primary office phone Ana Sandoval MD Emergency Medicine [phone] Juanjose Mustafa MD Internal Medicine [phone] Your Primary Care Physician (PCP) Primary Care Physician Office Phone Office Fax [facility] [phone] Follow-up Information Follow up With Details Comments Contact Info Juanjose Mustafa MD  Please call the office to schedule appointment. [address] [phone] My Medications TAKE these medications as instructed Instructions Each Dose to Equal  
 Morning Noon Evening Bedtime  
 amLODIPine 5 mg tablet Commonly known as:  Everette Alstrom Your last dose was: Your next dose is: Take 5 mg by mouth daily. 5 mg  
    
   
   
   
  
 cyclobenzaprine 5 mg tablet Commonly known as:  FLEXERIL Your last dose was: Your next dose is:    
   
   
 take 1 to 2 tablets by mouth three times a day if needed for MUSCLE SPASMS  
     
   
   
   
  
 donepezil 10 mg tablet Commonly known as:  ARICEPT Your last dose was: Your next dose is: TAKE 1 TABLET EACH NIGHT  
     
   
   
   
  
 loperamide 2 mg capsule Commonly known as:  IMODIUM Your last dose was: Your next dose is: Take 2 mg by mouth as needed for Diarrhea.  
 2 mg  
    
   
   
   
  
 memantine ER 28 mg capsule Commonly known as:  NAMENDA XR Your last dose was: Your next dose is: Take 1 Cap by mouth daily. 28 mg MULTIPLE VITAMIN tablet Generic drug:  multivitamin Your last dose was: Your next dose is: Take  by mouth. Takes one po once daily if remembered. omeprazole 20 mg capsule Commonly known as:  PRILOSEC Your last dose was: Your next dose is: Take 1 Cap by mouth daily. Indications: Gastric Ulcer 20 mg  
    
   
   
   
  
 SYNTHROID 88 mcg tablet Generic drug:  levothyroxine Your last dose was: Your next dose is: Take 88 mcg by mouth Daily (before breakfast). 88 mcg ULORIC 40 mg Tab tablet Generic drug:  febuxostat Your last dose was: Your next dose is: Take 40 mg by mouth daily. 40 mg  
    
   
   
   
  
 ZyrTEC 10 mg tablet Generic drug:  cetirizine Your last dose was: Your next dose is: Take 10 mg by mouth daily. 10 mg Discharge Instructions Patient Discharge Instructions Guille Stockotn / [id number] : 1935 Admitted 2018 Discharged: 7/10/2018 Take Home Medications · It is important that you take the medication exactly as they are prescribed. · Keep your medication in the bottles provided by the pharmacist and keep a list of the medication names, dosages, and times to be taken in your wallet. · Do not take other medications without consulting your doctor. What to do at [facility] Recommended diet: Regular Diet. Recommended activity: Activity as tolerated. Follow-up with Dr. Dalia Celestin as previously scheduled. Information obtained by : 
I understand that if any problems occur once I am at home I am to contact my physician. I understand and acknowledge receipt of the instructions indicated above. Physician's or R.N.'s Signature                                                                  Date/Time Patient or Representative Signature                                                          Date/Time Discharge Orders None Introducing [facility] [place] [facility]! Dear Denise Alfonso: Thank you for requesting a [employer] account. Our records indicate that you already have an active [employer] account. You can access your account anytime at [email or web address] Did you know that you can access your hospital and ER discharge instructions at any time in [employer]? You can also review all of your test results from your hospital stay or ER visit. Additional Information If you have questions, please visit the Frequently Asked Questions section of the MyChart website at [email or web address] Remember, MyChart is NOT to be used for urgent needs. For medical emergencies, dial 911. Now available from your iPhone and Android! General Information Please provide this summary of care documentation to your next provider. Patient Signature:  ____________________________________________________________ Date:  ____________________________________________________________  
  
Evonne  Provider Signature:  ____________________________________________________________ Date:  ____________________________________________________________

## 2018-07-09 NOTE — PROGRESS NOTES
TRANSFER - IN REPORT:    Verbal report received from Violetta(name) on Mercedes Quiñonez  being received from ED(unit) for routine progression of care      Report consisted of patients Situation, Background, Assessment and   Recommendations(SBAR). Information from the following report(s) SBAR, ED Summary, Intake/Output, MAR and Recent Results was reviewed with the receiving nurse. Opportunity for questions and clarification was provided. Assessment completed upon patients arrival to unit and care assumed.

## 2018-07-09 NOTE — PROGRESS NOTES
Primary Nurse Kenji Hernandez and Farida Maldonado RN performed a dual skin assessment on this patient. Scar noted R chest from a port, scabs noted on Left forearm, dark discoloration noted on sacrum.

## 2018-07-09 NOTE — ROUTINE PROCESS
TRANSFER - OUT REPORT:    Verbal report given to Imani Haynes RN(name) on Eugenio Nguyen  being transferred to 604(unit) for routine progression of care       Report consisted of patients Situation, Background, Assessment and   Recommendations(SBAR). Information from the following report(s) SBAR was reviewed with the receiving nurse. Lines:   Peripheral IV 07/09/18 Left Antecubital (Active)       Peripheral IV 07/09/18 Right Wrist (Active)        Opportunity for questions and clarification was provided.       Patient transported with:   transport

## 2018-07-10 VITALS
HEART RATE: 74 BPM | RESPIRATION RATE: 18 BRPM | WEIGHT: 152.78 LBS | BODY MASS INDEX: 25.45 KG/M2 | SYSTOLIC BLOOD PRESSURE: 187 MMHG | HEIGHT: 65 IN | OXYGEN SATURATION: 99 % | TEMPERATURE: 97.4 F | DIASTOLIC BLOOD PRESSURE: 65 MMHG

## 2018-07-10 LAB
ALBUMIN SERPL-MCNC: 3.3 G/DL (ref 3.5–5)
ALBUMIN/GLOB SERPL: 0.9 {RATIO} (ref 1.1–2.2)
ALP SERPL-CCNC: 160 U/L (ref 45–117)
ALT SERPL-CCNC: 52 U/L (ref 12–78)
AMYLASE SERPL-CCNC: 112 U/L (ref 25–115)
ANION GAP SERPL CALC-SCNC: 9 MMOL/L (ref 5–15)
AST SERPL-CCNC: 33 U/L (ref 15–37)
ATRIAL RATE: 72 BPM
BACTERIA SPEC CULT: NORMAL
BASOPHILS # BLD: 0 K/UL (ref 0–0.1)
BASOPHILS NFR BLD: 1 % (ref 0–1)
BILIRUB SERPL-MCNC: 0.5 MG/DL (ref 0.2–1)
BUN SERPL-MCNC: 14 MG/DL (ref 6–20)
BUN/CREAT SERPL: 12 (ref 12–20)
CALCIUM SERPL-MCNC: 9.2 MG/DL (ref 8.5–10.1)
CALCULATED P AXIS, ECG09: 70 DEGREES
CALCULATED R AXIS, ECG10: 70 DEGREES
CALCULATED T AXIS, ECG11: 48 DEGREES
CC UR VC: NORMAL
CHLORIDE SERPL-SCNC: 111 MMOL/L (ref 97–108)
CO2 SERPL-SCNC: 23 MMOL/L (ref 21–32)
CREAT SERPL-MCNC: 1.19 MG/DL (ref 0.55–1.02)
DIAGNOSIS, 93000: NORMAL
DIFFERENTIAL METHOD BLD: ABNORMAL
EOSINOPHIL # BLD: 0.1 K/UL (ref 0–0.4)
EOSINOPHIL NFR BLD: 1 % (ref 0–7)
ERYTHROCYTE [DISTWIDTH] IN BLOOD BY AUTOMATED COUNT: 12.8 % (ref 11.5–14.5)
GLOBULIN SER CALC-MCNC: 3.7 G/DL (ref 2–4)
GLUCOSE SERPL-MCNC: 119 MG/DL (ref 65–100)
HCT VFR BLD AUTO: 37.9 % (ref 35–47)
HGB BLD-MCNC: 12.4 G/DL (ref 11.5–16)
IMM GRANULOCYTES # BLD: 0 K/UL (ref 0–0.04)
IMM GRANULOCYTES NFR BLD AUTO: 0 % (ref 0–0.5)
LIPASE SERPL-CCNC: 218 U/L (ref 73–393)
LYMPHOCYTES # BLD: 0.8 K/UL (ref 0.8–3.5)
LYMPHOCYTES NFR BLD: 11 % (ref 12–49)
MCH RBC QN AUTO: 32.7 PG (ref 26–34)
MCHC RBC AUTO-ENTMCNC: 32.7 G/DL (ref 30–36.5)
MCV RBC AUTO: 100 FL (ref 80–99)
MONOCYTES # BLD: 0.7 K/UL (ref 0–1)
MONOCYTES NFR BLD: 9 % (ref 5–13)
NEUTS SEG # BLD: 6 K/UL (ref 1.8–8)
NEUTS SEG NFR BLD: 78 % (ref 32–75)
NRBC # BLD: 0 K/UL (ref 0–0.01)
NRBC BLD-RTO: 0 PER 100 WBC
P-R INTERVAL, ECG05: 170 MS
PLATELET # BLD AUTO: 206 K/UL (ref 150–400)
PMV BLD AUTO: 10.5 FL (ref 8.9–12.9)
POTASSIUM SERPL-SCNC: 3.8 MMOL/L (ref 3.5–5.1)
PROT SERPL-MCNC: 7 G/DL (ref 6.4–8.2)
Q-T INTERVAL, ECG07: 452 MS
QRS DURATION, ECG06: 140 MS
QTC CALCULATION (BEZET), ECG08: 494 MS
RBC # BLD AUTO: 3.79 M/UL (ref 3.8–5.2)
SERVICE CMNT-IMP: NORMAL
SODIUM SERPL-SCNC: 143 MMOL/L (ref 136–145)
VENTRICULAR RATE, ECG03: 72 BPM
WBC # BLD AUTO: 7.6 K/UL (ref 3.6–11)

## 2018-07-10 PROCEDURE — 85025 COMPLETE CBC W/AUTO DIFF WBC: CPT | Performed by: INTERNAL MEDICINE

## 2018-07-10 PROCEDURE — 36415 COLL VENOUS BLD VENIPUNCTURE: CPT | Performed by: INTERNAL MEDICINE

## 2018-07-10 PROCEDURE — 82150 ASSAY OF AMYLASE: CPT | Performed by: INTERNAL MEDICINE

## 2018-07-10 PROCEDURE — 74011250636 HC RX REV CODE- 250/636: Performed by: EMERGENCY MEDICINE

## 2018-07-10 PROCEDURE — 74011250637 HC RX REV CODE- 250/637: Performed by: INTERNAL MEDICINE

## 2018-07-10 PROCEDURE — 83690 ASSAY OF LIPASE: CPT | Performed by: INTERNAL MEDICINE

## 2018-07-10 PROCEDURE — 80053 COMPREHEN METABOLIC PANEL: CPT | Performed by: INTERNAL MEDICINE

## 2018-07-10 RX ADMIN — AMLODIPINE BESYLATE 5 MG: 5 TABLET ORAL at 08:19

## 2018-07-10 RX ADMIN — THERA TABS 1 TABLET: TAB at 08:19

## 2018-07-10 RX ADMIN — PANTOPRAZOLE SODIUM 40 MG: 40 TABLET, DELAYED RELEASE ORAL at 07:10

## 2018-07-10 RX ADMIN — CETIRIZINE HYDROCHLORIDE 10 MG: 10 TABLET, FILM COATED ORAL at 08:19

## 2018-07-10 RX ADMIN — FEBUXOSTAT 40 MG: 40 TABLET ORAL at 09:16

## 2018-07-10 RX ADMIN — LEVOTHYROXINE SODIUM 88 MCG: 88 TABLET ORAL at 07:10

## 2018-07-10 RX ADMIN — CLONIDINE HYDROCHLORIDE 0.1 MG: 0.1 TABLET ORAL at 08:21

## 2018-07-10 RX ADMIN — MEMANTINE HYDROCHLORIDE 10 MG: 10 TABLET ORAL at 08:22

## 2018-07-10 RX ADMIN — SODIUM CHLORIDE 75 ML/HR: 900 INJECTION, SOLUTION INTRAVENOUS at 07:11

## 2018-07-10 NOTE — PROGRESS NOTES
Reason for Admission:   N/V, dilation of biliary duct                  RRAT Score:     20             Do you (patient/family) have any concerns for transition/discharge? None voiced by patient              Plan for utilizing home health:     None as patient is not homebound    Likelihood of readmission?   low            Transition of Care Plan:      Patient reports full independence and is up ab steven in room. Patient has been discharged home and stated she has no concerns for going home. Patient has good family support. IM signed and on bedside chart    Care Management Interventions  PCP Verified by CM:  Yes  Current Support Network: Own Home  Confirm Follow Up Transport: Family  Plan discussed with Pt/Family/Caregiver: Yes  Freedom of Choice Offered: Yes  Discharge Location  Discharge Placement: Home

## 2018-07-10 NOTE — PROGRESS NOTES
Jessy Riggs, Kamala & Eduardo    Admit Date: 7/9/2018    Subjective:     No new complaints. Pt says she feels fine. Denies any more N/V. Current Facility-Administered Medications   Medication Dose Route Frequency    0.9% sodium chloride infusion  75 mL/hr IntraVENous CONTINUOUS    amLODIPine (NORVASC) tablet 5 mg  5 mg Oral DAILY    cetirizine (ZYRTEC) tablet 10 mg  10 mg Oral DAILY    cyclobenzaprine (FLEXERIL) tablet 5 mg  5 mg Oral TID PRN    donepezil (ARICEPT) tablet 10 mg  10 mg Oral QHS    febuxostat (ULORIC) tablet 40 mg  40 mg Oral DAILY    levothyroxine (SYNTHROID) tablet 88 mcg  88 mcg Oral ACB    loperamide (IMODIUM) capsule 2 mg  2 mg Oral PRN    therapeutic multivitamin (THERAGRAN) tablet 1 Tab  1 Tab Oral DAILY    ondansetron (ZOFRAN) injection 4 mg  4 mg IntraVENous Q6H PRN    acetaminophen (TYLENOL) tablet 650 mg  650 mg Oral Q4H PRN    cloNIDine HCl (CATAPRES) tablet 0.1 mg  0.1 mg Oral Q4H PRN    memantine (NAMENDA) tablet 10 mg  10 mg Oral BID    pantoprazole (PROTONIX) tablet 40 mg  40 mg Oral ACB    enoxaparin (LOVENOX) injection 30 mg  30 mg SubCUTAneous Q24H          Objective:     Patient Vitals for the past 8 hrs:   BP Temp Pulse Resp SpO2   07/10/18 0013 172/62 97.6 °F (36.4 °C) 76 18 99 %             Physical Exam: NAD. Alert. Neck -- Supple. No JVD. Heart -- RRR. Lungs -- CTA. Abd -- Soft. NT/ND. Benign. Ext -- No LE edema, b/l.       Data Review   Recent Results (from the past 24 hour(s))   EKG, 12 LEAD, INITIAL    Collection Time: 07/09/18  8:01 AM   Result Value Ref Range    Ventricular Rate 72 BPM    Atrial Rate 72 BPM    P-R Interval 170 ms    QRS Duration 140 ms    Q-T Interval 452 ms    QTC Calculation (Bezet) 494 ms    Calculated P Axis 70 degrees    Calculated R Axis 70 degrees    Calculated T Axis 48 degrees    Diagnosis       Normal sinus rhythm  Right bundle branch block  When compared with ECG of 01-JUL-2009 09:52,  Previous ECG has undetermined rhythm, needs review     CBC WITH AUTOMATED DIFF    Collection Time: 07/09/18  8:07 AM   Result Value Ref Range    WBC 6.7 3.6 - 11.0 K/uL    RBC 4.20 3.80 - 5.20 M/uL    HGB 13.8 11.5 - 16.0 g/dL    HCT 42.1 35.0 - 47.0 %    .2 (H) 80.0 - 99.0 FL    MCH 32.9 26.0 - 34.0 PG    MCHC 32.8 30.0 - 36.5 g/dL    RDW 12.9 11.5 - 14.5 %    PLATELET 778 476 - 871 K/uL    MPV 10.0 8.9 - 12.9 FL    NRBC 0.0 0  WBC    ABSOLUTE NRBC 0.00 0.00 - 0.01 K/uL    NEUTROPHILS 78 (H) 32 - 75 %    LYMPHOCYTES 12 12 - 49 %    MONOCYTES 7 5 - 13 %    EOSINOPHILS 2 0 - 7 %    BASOPHILS 1 0 - 1 %    IMMATURE GRANULOCYTES 0 0.0 - 0.5 %    ABS. NEUTROPHILS 5.2 1.8 - 8.0 K/UL    ABS. LYMPHOCYTES 0.8 0.8 - 3.5 K/UL    ABS. MONOCYTES 0.5 0.0 - 1.0 K/UL    ABS. EOSINOPHILS 0.1 0.0 - 0.4 K/UL    ABS. BASOPHILS 0.1 0.0 - 0.1 K/UL    ABS. IMM. GRANS. 0.0 0.00 - 0.04 K/UL    DF AUTOMATED     METABOLIC PANEL, COMPREHENSIVE    Collection Time: 07/09/18  8:07 AM   Result Value Ref Range    Sodium 139 136 - 145 mmol/L    Potassium 4.2 3.5 - 5.1 mmol/L    Chloride 108 97 - 108 mmol/L    CO2 20 (L) 21 - 32 mmol/L    Anion gap 11 5 - 15 mmol/L    Glucose 150 (H) 65 - 100 mg/dL    BUN 21 (H) 6 - 20 MG/DL    Creatinine 1.55 (H) 0.55 - 1.02 MG/DL    BUN/Creatinine ratio 14 12 - 20      GFR est AA 39 (L) >60 ml/min/1.73m2    GFR est non-AA 32 (L) >60 ml/min/1.73m2    Calcium 9.2 8.5 - 10.1 MG/DL    Bilirubin, total 0.5 0.2 - 1.0 MG/DL    ALT (SGPT) 71 12 - 78 U/L    AST (SGOT) 54 (H) 15 - 37 U/L    Alk.  phosphatase 201 (H) 45 - 117 U/L    Protein, total 7.8 6.4 - 8.2 g/dL    Albumin 3.6 3.5 - 5.0 g/dL    Globulin 4.2 (H) 2.0 - 4.0 g/dL    A-G Ratio 0.9 (L) 1.1 - 2.2     TROPONIN I    Collection Time: 07/09/18  8:07 AM   Result Value Ref Range    Troponin-I, Qt. <0.05 <0.05 ng/mL   LIPASE Collection Time: 07/09/18  8:07 AM   Result Value Ref Range    Lipase 247 73 - 393 U/L   URINALYSIS W/ REFLEX CULTURE    Collection Time: 07/09/18  8:20 AM   Result Value Ref Range    Color YELLOW/STRAW      Appearance CLEAR CLEAR      Specific gravity 1.013 1.003 - 1.030      pH (UA) 5.5 5.0 - 8.0      Protein 100 (A) NEG mg/dL    Glucose 250 (A) NEG mg/dL    Ketone NEGATIVE  NEG mg/dL    Bilirubin NEGATIVE  NEG      Blood TRACE (A) NEG      Urobilinogen 0.2 0.2 - 1.0 EU/dL    Nitrites NEGATIVE  NEG      Leukocyte Esterase NEGATIVE  NEG      WBC 5-10 0 - 4 /hpf    RBC 0-5 0 - 5 /hpf    Epithelial cells MODERATE (A) FEW /lpf    Bacteria NEGATIVE  NEG /hpf    UA:UC IF INDICATED URINE CULTURE ORDERED (A) CNI     LACTIC ACID    Collection Time: 07/09/18  8:39 AM   Result Value Ref Range    Lactic acid 2.8 (HH) 0.4 - 2.0 MMOL/L   LACTIC ACID    Collection Time: 07/09/18 11:22 AM   Result Value Ref Range    Lactic acid 1.8 0.4 - 2.0 MMOL/L   CBC WITH AUTOMATED DIFF    Collection Time: 07/10/18  3:32 AM   Result Value Ref Range    WBC 7.6 3.6 - 11.0 K/uL    RBC 3.79 (L) 3.80 - 5.20 M/uL    HGB 12.4 11.5 - 16.0 g/dL    HCT 37.9 35.0 - 47.0 %    .0 (H) 80.0 - 99.0 FL    MCH 32.7 26.0 - 34.0 PG    MCHC 32.7 30.0 - 36.5 g/dL    RDW 12.8 11.5 - 14.5 %    PLATELET 731 689 - 308 K/uL    MPV 10.5 8.9 - 12.9 FL    NRBC 0.0 0  WBC    ABSOLUTE NRBC 0.00 0.00 - 0.01 K/uL    NEUTROPHILS 78 (H) 32 - 75 %    LYMPHOCYTES 11 (L) 12 - 49 %    MONOCYTES 9 5 - 13 %    EOSINOPHILS 1 0 - 7 %    BASOPHILS 1 0 - 1 %    IMMATURE GRANULOCYTES 0 0.0 - 0.5 %    ABS. NEUTROPHILS 6.0 1.8 - 8.0 K/UL    ABS. LYMPHOCYTES 0.8 0.8 - 3.5 K/UL    ABS. MONOCYTES 0.7 0.0 - 1.0 K/UL    ABS. EOSINOPHILS 0.1 0.0 - 0.4 K/UL    ABS. BASOPHILS 0.0 0.0 - 0.1 K/UL    ABS. IMM.  GRANS. 0.0 0.00 - 0.04 K/UL    DF AUTOMATED     METABOLIC PANEL, COMPREHENSIVE    Collection Time: 07/10/18  3:32 AM   Result Value Ref Range    Sodium 143 136 - 145 mmol/L Potassium 3.8 3.5 - 5.1 mmol/L    Chloride 111 (H) 97 - 108 mmol/L    CO2 23 21 - 32 mmol/L    Anion gap 9 5 - 15 mmol/L    Glucose 119 (H) 65 - 100 mg/dL    BUN 14 6 - 20 MG/DL    Creatinine 1.19 (H) 0.55 - 1.02 MG/DL    BUN/Creatinine ratio 12 12 - 20      GFR est AA 53 (L) >60 ml/min/1.73m2    GFR est non-AA 44 (L) >60 ml/min/1.73m2    Calcium 9.2 8.5 - 10.1 MG/DL    Bilirubin, total 0.5 0.2 - 1.0 MG/DL    ALT (SGPT) 52 12 - 78 U/L    AST (SGOT) 33 15 - 37 U/L    Alk. phosphatase 160 (H) 45 - 117 U/L    Protein, total 7.0 6.4 - 8.2 g/dL    Albumin 3.3 (L) 3.5 - 5.0 g/dL    Globulin 3.7 2.0 - 4.0 g/dL    A-G Ratio 0.9 (L) 1.1 - 2.2     LIPASE    Collection Time: 07/10/18  3:32 AM   Result Value Ref Range    Lipase 218 73 - 393 U/L   AMYLASE    Collection Time: 07/10/18  3:32 AM   Result Value Ref Range    Amylase 112 25 - 115 U/L           Assessment:     Principal Problem:    Dilation of biliary tract (7/9/2018) -- Unable to do MRCP, but CT unremarkable. I doubt significance of this. Active Problems:    Essential hypertension, benign (11/12/2010)      Hypothyroidism due to acquired atrophy of thyroid (7/19/2016)      Nausea & vomiting -- Improved. Plan:     1. Discharge home today, assuming she tolerates regular diet. D/w .       Susan Peace MD

## 2018-07-10 NOTE — PROGRESS NOTES
Bedside shift change report given to [address] [place] [address] (oncoming nurse) by Jose Roberto Gonzalez (offgoing nurse). Report included the following information SBAR, Kardex, Intake/Output, MAR, Recent Results and Med Rec Status.

## 2018-07-10 NOTE — PHYSICIAN ADVISORY
Letter of Status Determination:    Recommend Changing patient status from   INPATIENT to OBSERVATION      Pt Name:  Michael Gann   MR#  [id number]   [facility]#   [id number]   Room and Hospital  617/01  @ [address] [place] [address]   Hospitalization date  7/9/2018  7:51 AM   Current Attending Physician  Jacqui Palma MD   Principal diagnosis  Dilation of biliary tract      Clinicals     The pt was noted to have abnormal MRCP suggesting CBD dilation. CT ruled that issue out and he has remained hemodynamically stable throughout the short hospital course.           [place] MCG criteria   Does  apply    STATUS DETERMINATION  On the basis of review of available clinical data, documentation in the chart  It is our recommendation that the patient's status is changed from INPATIENT to OBSERVATION         The final decision of the patient's hospitalization status depends on the attending physician's judgment      Additional comments     Insurance  Payor: [address] [place] [address] / Plan: [address] / Product Type: Managed Care Medicare /             Schuyler Bence MD MPH FACP     Physician Advisor    [address]   President Medical Staff, [address] [place] [address]    Cell  [phone]

## 2018-07-10 NOTE — DISCHARGE INSTRUCTIONS
Patient Discharge Instructions    Michael Gann / [id number] : 1935    Admitted 2018 Discharged: 7/10/2018     Take Home Medications       · It is important that you take the medication exactly as they are prescribed. · Keep your medication in the bottles provided by the pharmacist and keep a list of the medication names, dosages, and times to be taken in your wallet. · Do not take other medications without consulting your doctor. What to do at Home    Recommended diet: Regular Diet. Recommended activity: Activity as tolerated. Follow-up with Dr. Lupe Stewart as previously scheduled. Information obtained by :  I understand that if any problems occur once I am at home I am to contact my physician. I understand and acknowledge receipt of the instructions indicated above.                                                                                                                                            Physician's or R.N.'s Signature                                                                  Date/Time                                                                                                                                              Patient or Representative Signature                                                          Date/Time

## 2018-07-10 NOTE — PROGRESS NOTES
Patient should call Dr. London Marcano office to schedule Hospital PCP f/u appointment.      Jelly Morejon CM Specialist

## 2018-07-10 NOTE — PROGRESS NOTES
Bedside shift change report given to Adi Zurita (oncoming nurse) by Poornima Goldstein (offgoing nurse). Report included the following information SBAR. I have reviewed discharge instructions with the patient's daughter, Victoria Kyle. The caregiver verbalized understanding.

## 2018-07-12 NOTE — DISCHARGE SUMMARY
Physician Discharge Summary     Patient ID:  Jose Serra  [id number]  82 y.o.  1935    Admit date: 7/9/2018    Discharge date and time: 7/12/2018    Briefly, pt was admitted with Dilation of biliary tract. For details of admission, see H&P. Hospital Course:  Pt was admitted with N/v & possible mild dilatation of the biliary/pancreatic ducts. She improved with IVF, and her lactate normalized. She was unable to tolerate a MRCP, so a CT was done (PO contrast only) -- no significant abnormalities. It is suspected pt may have had a viral gastroenteritis, and she improved. Discharge Dx: suspected gastroenteritis    Condition at discharge: improved. Disposition: home    Patient Instructions:   Cannot display discharge medications since this patient is not currently admitted. Follow-up with Dr. Cabrera Caicedo as previously scheduled.         Signed:  Eri Mario MD  7/12/2018  1:42 PM

## 2018-07-14 LAB
BACTERIA SPEC CULT: NORMAL
SERVICE CMNT-IMP: NORMAL

## 2018-11-29 ENCOUNTER — HOSPITAL ENCOUNTER (EMERGENCY)
Age: 83
Discharge: HOME | End: 2018-11-29
Attending: STUDENT IN AN ORGANIZED HEALTH CARE EDUCATION/TRAINING PROGRAM
Payer: MEDICARE

## 2018-11-29 VITALS
DIASTOLIC BLOOD PRESSURE: 72 MMHG | OXYGEN SATURATION: 100 % | TEMPERATURE: 97.5 F | HEART RATE: 73 BPM | RESPIRATION RATE: 16 BRPM | SYSTOLIC BLOOD PRESSURE: 159 MMHG

## 2018-11-29 DIAGNOSIS — J02.9 SORE THROAT: Primary | ICD-10-CM

## 2018-11-29 PROCEDURE — 74011250637 HC RX REV CODE- 250/637: Performed by: STUDENT IN AN ORGANIZED HEALTH CARE EDUCATION/TRAINING PROGRAM

## 2018-11-29 PROCEDURE — 99283 EMERGENCY DEPT VISIT LOW MDM: CPT

## 2018-11-29 PROCEDURE — 74011000250 HC RX REV CODE- 250: Performed by: STUDENT IN AN ORGANIZED HEALTH CARE EDUCATION/TRAINING PROGRAM

## 2018-11-29 RX ADMIN — LIDOCAINE HYDROCHLORIDE 40 ML: 20 SOLUTION ORAL; TOPICAL at 21:42

## 2018-11-30 NOTE — ED TRIAGE NOTES
triage note : pt arrives via private vehicle with c/o right sided throat pain and difficulty swallowing  x1 day . Denies chest pain and SOB . Pt is a&o x4 . VSS

## 2018-11-30 NOTE — ED NOTES
Discharge instructions given to pt by MD and RN . Pt has been given counseling on med use and verbalizes  understanding . Alban Cox Pt ambulated off unit with no signs of distress.

## 2018-11-30 NOTE — ED PROVIDER NOTES
82 y.o. female with past medical history significant for Alzheimer disease, HTN, colon cancer, ulcerative colitis, Grave's disease, hypothyroidism, asthma, nephritis, cataracts, anemia, and gout who presents from home with chief complaint of sore throat. Pt states that she has been experiencing a worsening sore throat throughout the day today and ~1 hour ago started having a difficult time swallowing. She also reports a couple days of shoulder pain. Pt reports daily compliance with her medications. Per , the pt has borderline dementia. Pt denies cough, congestion, fever, SOB, or chest pain. There are no other acute medical concerns at this time. Social hx: former tobacco smoker (quit 44 years ago); occasional EtOH use (wine) PCP: Charly Rosa MD 
 
 
Note written by Melo Henson, as dictated by Freddy Bar MD 9:31 PM 
 
 
The history is provided by the patient and the spouse. No  was used. Past Medical History:  
Diagnosis Date  Alzheimer's disease 7/19/2016  Anemia 10/10/2013  Asthma CHILDHOOD  Cataracts, both eyes  Colon cancer ([facility] [address] 7/09 Partial colectomy & chemo  Essential hypertension, benign 11/12/2010  Gout  Grave's disease   
 s/p LAZAR ablation.  Hypertension  Hypothyroidism 6/13/2011  Nephritis and nephropathy, not specified as acute or chronic, with unspecified pathological lesion in kidney H/O -- Unclear details  Osteoporosis  Ulcerative colitis Past Surgical History:  
Procedure Laterality Date  AMB POC GI TRACT CAPSULE ENDOSCOPY  10/31/2016  COLONOSCOPY N/A 8/24/2016 COLONOSCOPY performed by Jose Schulte MD at [address] HX COLECTOMY  7 09  
 colon ca  HX GI    
 COLONOSCOPY YEARLY  
 HX HEENT    
 WISDOM TEETH  
 HX TONSILLECTOMY  UPPER GI ENDOSCOPY,BIOPSY  10/26/2016 Family History:  
Problem Relation Age of Onset  Hypertension Father  Stroke Father  Cancer Father   
     prostate  Kidney Disease Other \"nephritis\" Social History Socioeconomic History  Marital status:  Spouse name: Not on file  Number of children: Not on file  Years of education: Not on file  Highest education level: Not on file Social Needs  Financial resource strain: Not on file  Food insecurity - worry: Not on file  Food insecurity - inability: Not on file  Transportation needs - medical: Not on file  Transportation needs - non-medical: Not on file Occupational History  Not on file Tobacco Use  Smoking status: Former Smoker Packs/day: 0.25 Years: 20.00 Pack years: 5.00 Last attempt to quit: 1984 Years since quittin.9  Smokeless tobacco: Never Used Substance and Sexual Activity  Alcohol use: Yes Comment: OCCASIONAL WINE  
 Drug use: No  
 Sexual activity: Not on file Other Topics Concern  Not on file Social History Narrative  Not on file ALLERGIES: Actonel [risedronate]; Codeine; Pcn [penicillins]; and Sulfa (sulfonamide antibiotics) Review of Systems Constitutional: Negative for chills and fever. HENT: Positive for sore throat and trouble swallowing. Respiratory: Negative for cough and shortness of breath. Cardiovascular: Negative for chest pain. Gastrointestinal: Negative for abdominal pain and vomiting. Genitourinary: Negative for dysuria. Musculoskeletal: Negative for back pain. Skin: Negative for rash. Neurological: Negative for syncope and headaches. Psychiatric/Behavioral: Negative for confusion. All other systems reviewed and are negative. Vitals:  
 18 2118 18 2145 18 2156 BP:  159/72 Pulse: 75 73 Resp:  16 Temp:  97.5 °F (36.4 °C) SpO2: 100% 100% 100% Physical Exam  
 Constitutional: She is oriented to person, place, and time. She appears well-developed. No distress. HENT:  
Head: Normocephalic and atraumatic. Mouth/Throat: Uvula is midline, oropharynx is clear and moist and mucous membranes are normal. No trismus in the jaw. No dental abscesses or uvula swelling. No oropharyngeal exudate, posterior oropharyngeal edema or posterior oropharyngeal erythema. Eyes: Conjunctivae and EOM are normal.  
Neck: Normal range of motion. Neck supple. Cardiovascular: Normal rate, regular rhythm and normal heart sounds. Pulmonary/Chest: Effort normal and breath sounds normal. No respiratory distress. Abdominal: Soft. There is no tenderness. There is no guarding. Musculoskeletal: Normal range of motion. She exhibits no edema. Neurological: She is alert and oriented to person, place, and time. She exhibits normal muscle tone. Skin: Skin is warm and dry. Note written by Melo Biswas, as dictated by Wei Conner MD 9:32 PM  
 
MDM Procedures

## 2018-11-30 NOTE — DISCHARGE INSTRUCTIONS
Sore Throat: Care Instructions  Your Care Instructions    Infection by bacteria or a virus causes most sore throats. Cigarette smoke, dry air, air pollution, allergies, and yelling can also cause a sore throat. Sore throats can be painful and annoying. Fortunately, most sore throats go away on their own. If you have a bacterial infection, your doctor may prescribe antibiotics. Follow-up care is a key part of your treatment and safety. Be sure to make and go to all appointments, and call your doctor if you are having problems. It's also a good idea to know your test results and keep a list of the medicines you take. How can you care for yourself at home? · If your doctor prescribed antibiotics, take them as directed. Do not stop taking them just because you feel better. You need to take the full course of antibiotics. · Gargle with warm salt water once an hour to help reduce swelling and relieve discomfort. Use 1 teaspoon of salt mixed in 1 cup of warm water. · Take an over-the-counter pain medicine, such as acetaminophen (Tylenol), ibuprofen (Advil, Motrin), or naproxen (Aleve). Read and follow all instructions on the label. · Be careful when taking over-the-counter cold or flu medicines and Tylenol at the same time. Many of these medicines have acetaminophen, which is Tylenol. Read the labels to make sure that you are not taking more than the recommended dose. Too much acetaminophen (Tylenol) can be harmful. · Drink plenty of fluids. Fluids may help soothe an irritated throat. Hot fluids, such as tea or soup, may help decrease throat pain. · Use over-the-counter throat lozenges to soothe pain. Regular cough drops or hard candy may also help. These should not be given to young children because of the risk of choking. · Do not smoke or allow others to smoke around you. If you need help quitting, talk to your doctor about stop-smoking programs and medicines.  These can increase your chances of quitting for good. · Use a vaporizer or humidifier to add moisture to your bedroom. Follow the directions for cleaning the machine. When should you call for help? Call your doctor now or seek immediate medical care if:    · You have new or worse trouble swallowing.     · Your sore throat gets much worse on one side.    Watch closely for changes in your health, and be sure to contact your doctor if you do not get better as expected. Where can you learn more? Go to http://alice-cely.info/. Enter [phone] in the search box to learn more about \"Sore Throat: Care Instructions. \"  Current as of: March 28, 2018  Content Version: 11.8  © 1293-4380 [employer]. Care instructions adapted under license by [employer] (which disclaims liability or warranty for this information). If you have questions about a medical condition or this instruction, always ask your healthcare professional. [address] any warranty or liability for your use of this information.

## 2018-12-01 ENCOUNTER — HOSPITAL ENCOUNTER (OUTPATIENT)
Age: 83
Setting detail: OBSERVATION
Discharge: HOME | End: 2018-12-02
Attending: EMERGENCY MEDICINE | Admitting: HOSPITALIST
Payer: MEDICARE

## 2018-12-01 ENCOUNTER — ANESTHESIA EVENT (OUTPATIENT)
Dept: SURGERY | Age: 83
End: 2018-12-01
Payer: MEDICARE

## 2018-12-01 ENCOUNTER — ANESTHESIA (OUTPATIENT)
Dept: SURGERY | Age: 83
End: 2018-12-01
Payer: MEDICARE

## 2018-12-01 ENCOUNTER — APPOINTMENT (OUTPATIENT)
Dept: CT IMAGING | Age: 83
End: 2018-12-01
Attending: EMERGENCY MEDICINE
Payer: MEDICARE

## 2018-12-01 DIAGNOSIS — T18.108A FOREIGN BODY IN ESOPHAGUS, INITIAL ENCOUNTER: Primary | ICD-10-CM

## 2018-12-01 DIAGNOSIS — R13.10 DYSPHAGIA, UNSPECIFIED TYPE: ICD-10-CM

## 2018-12-01 LAB
ALBUMIN SERPL-MCNC: 3.7 G/DL (ref 3.5–5)
ALBUMIN/GLOB SERPL: 0.8 {RATIO} (ref 1.1–2.2)
ALP SERPL-CCNC: 220 U/L (ref 45–117)
ALT SERPL-CCNC: 38 U/L (ref 12–78)
ANION GAP SERPL CALC-SCNC: 6 MMOL/L (ref 5–15)
AST SERPL-CCNC: 27 U/L (ref 15–37)
BASOPHILS # BLD: 0.1 K/UL (ref 0–0.1)
BASOPHILS NFR BLD: 1 % (ref 0–1)
BILIRUB SERPL-MCNC: 0.4 MG/DL (ref 0.2–1)
BUN SERPL-MCNC: 16 MG/DL (ref 6–20)
BUN/CREAT SERPL: 10 (ref 12–20)
CALCIUM SERPL-MCNC: 9.8 MG/DL (ref 8.5–10.1)
CHLORIDE SERPL-SCNC: 110 MMOL/L (ref 97–108)
CO2 SERPL-SCNC: 23 MMOL/L (ref 21–32)
COMMENT, HOLDF: NORMAL
CREAT SERPL-MCNC: 1.53 MG/DL (ref 0.55–1.02)
DIFFERENTIAL METHOD BLD: ABNORMAL
EOSINOPHIL # BLD: 0.1 K/UL (ref 0–0.4)
EOSINOPHIL NFR BLD: 1 % (ref 0–7)
ERYTHROCYTE [DISTWIDTH] IN BLOOD BY AUTOMATED COUNT: 12.9 % (ref 11.5–14.5)
GLOBULIN SER CALC-MCNC: 4.8 G/DL (ref 2–4)
GLUCOSE SERPL-MCNC: 119 MG/DL (ref 65–100)
HCT VFR BLD AUTO: 41.7 % (ref 35–47)
HGB BLD-MCNC: 13.2 G/DL (ref 11.5–16)
IMM GRANULOCYTES # BLD: 0.1 K/UL (ref 0–0.04)
IMM GRANULOCYTES NFR BLD AUTO: 1 % (ref 0–0.5)
LYMPHOCYTES # BLD: 1.1 K/UL (ref 0.8–3.5)
LYMPHOCYTES NFR BLD: 9 % (ref 12–49)
MCH RBC QN AUTO: 30.7 PG (ref 26–34)
MCHC RBC AUTO-ENTMCNC: 31.7 G/DL (ref 30–36.5)
MCV RBC AUTO: 97 FL (ref 80–99)
MONOCYTES # BLD: 0.9 K/UL (ref 0–1)
MONOCYTES NFR BLD: 7 % (ref 5–13)
NEUTS SEG # BLD: 9.7 K/UL (ref 1.8–8)
NEUTS SEG NFR BLD: 82 % (ref 32–75)
NRBC # BLD: 0 K/UL (ref 0–0.01)
NRBC BLD-RTO: 0 PER 100 WBC
PLATELET # BLD AUTO: 327 K/UL (ref 150–400)
PMV BLD AUTO: 9.8 FL (ref 8.9–12.9)
POTASSIUM SERPL-SCNC: 4.1 MMOL/L (ref 3.5–5.1)
PROT SERPL-MCNC: 8.5 G/DL (ref 6.4–8.2)
RBC # BLD AUTO: 4.3 M/UL (ref 3.8–5.2)
SAMPLES BEING HELD,HOLD: NORMAL
SODIUM SERPL-SCNC: 139 MMOL/L (ref 136–145)
WBC # BLD AUTO: 12 K/UL (ref 3.6–11)

## 2018-12-01 PROCEDURE — C9113 INJ PANTOPRAZOLE SODIUM, VIA: HCPCS | Performed by: SPECIALIST

## 2018-12-01 PROCEDURE — 71250 CT THORAX DX C-: CPT

## 2018-12-01 PROCEDURE — 77030009426 HC FCPS BIOP ENDOSC BSC -B: Performed by: SPECIALIST

## 2018-12-01 PROCEDURE — 74011250636 HC RX REV CODE- 250/636: Performed by: SPECIALIST

## 2018-12-01 PROCEDURE — 77030031656 HC FCPS ENDO GRSP DISP BSC -B: Performed by: SPECIALIST

## 2018-12-01 PROCEDURE — 80053 COMPREHEN METABOLIC PANEL: CPT

## 2018-12-01 PROCEDURE — 74011000250 HC RX REV CODE- 250: Performed by: SPECIALIST

## 2018-12-01 PROCEDURE — 74011250636 HC RX REV CODE- 250/636: Performed by: ANESTHESIOLOGY

## 2018-12-01 PROCEDURE — 85025 COMPLETE CBC W/AUTO DIFF WBC: CPT

## 2018-12-01 PROCEDURE — 99218 HC RM OBSERVATION: CPT

## 2018-12-01 PROCEDURE — 74011250636 HC RX REV CODE- 250/636

## 2018-12-01 PROCEDURE — 99285 EMERGENCY DEPT VISIT HI MDM: CPT

## 2018-12-01 PROCEDURE — 76210000016 HC OR PH I REC 1 TO 1.5 HR: Performed by: SPECIALIST

## 2018-12-01 PROCEDURE — 76010000138 HC OR TIME 0.5 TO 1 HR: Performed by: SPECIALIST

## 2018-12-01 PROCEDURE — 74011000250 HC RX REV CODE- 250

## 2018-12-01 PROCEDURE — 74011250637 HC RX REV CODE- 250/637: Performed by: HOSPITALIST

## 2018-12-01 PROCEDURE — 77030037339 HC NET FB ENDO RETVR RESCUNT DISP BSC -B: Performed by: SPECIALIST

## 2018-12-01 PROCEDURE — 88305 TISSUE EXAM BY PATHOLOGIST: CPT

## 2018-12-01 PROCEDURE — 76060000032 HC ANESTHESIA 0.5 TO 1 HR: Performed by: SPECIALIST

## 2018-12-01 RX ORDER — DONEPEZIL HYDROCHLORIDE 5 MG/1
5 TABLET, FILM COATED ORAL
Status: DISCONTINUED | OUTPATIENT
Start: 2018-12-01 | End: 2018-12-02 | Stop reason: HOSPADM

## 2018-12-01 RX ORDER — SUCCINYLCHOLINE CHLORIDE 20 MG/ML
INJECTION INTRAMUSCULAR; INTRAVENOUS AS NEEDED
Status: DISCONTINUED | OUTPATIENT
Start: 2018-12-01 | End: 2018-12-01 | Stop reason: HOSPADM

## 2018-12-01 RX ORDER — PANTOPRAZOLE SODIUM 40 MG/10ML
40 INJECTION, POWDER, LYOPHILIZED, FOR SOLUTION INTRAVENOUS
Status: DISCONTINUED | OUTPATIENT
Start: 2018-12-01 | End: 2018-12-01 | Stop reason: SDUPTHER

## 2018-12-01 RX ORDER — NALOXONE HYDROCHLORIDE 0.4 MG/ML
0.4 INJECTION, SOLUTION INTRAMUSCULAR; INTRAVENOUS; SUBCUTANEOUS AS NEEDED
Status: DISCONTINUED | OUTPATIENT
Start: 2018-12-01 | End: 2018-12-02 | Stop reason: HOSPADM

## 2018-12-01 RX ORDER — MIDAZOLAM HYDROCHLORIDE 1 MG/ML
1 INJECTION, SOLUTION INTRAMUSCULAR; INTRAVENOUS AS NEEDED
Status: DISCONTINUED | OUTPATIENT
Start: 2018-12-01 | End: 2018-12-01 | Stop reason: HOSPADM

## 2018-12-01 RX ORDER — SODIUM CHLORIDE 0.9 % (FLUSH) 0.9 %
5-10 SYRINGE (ML) INJECTION EVERY 8 HOURS
Status: DISCONTINUED | OUTPATIENT
Start: 2018-12-01 | End: 2018-12-02 | Stop reason: HOSPADM

## 2018-12-01 RX ORDER — ONDANSETRON 2 MG/ML
INJECTION INTRAMUSCULAR; INTRAVENOUS AS NEEDED
Status: DISCONTINUED | OUTPATIENT
Start: 2018-12-01 | End: 2018-12-01 | Stop reason: HOSPADM

## 2018-12-01 RX ORDER — FENTANYL CITRATE 50 UG/ML
50 INJECTION, SOLUTION INTRAMUSCULAR; INTRAVENOUS AS NEEDED
Status: DISCONTINUED | OUTPATIENT
Start: 2018-12-01 | End: 2018-12-01 | Stop reason: HOSPADM

## 2018-12-01 RX ORDER — ZOLPIDEM TARTRATE 5 MG/1
5 TABLET ORAL
Status: DISCONTINUED | OUTPATIENT
Start: 2018-12-01 | End: 2018-12-02 | Stop reason: HOSPADM

## 2018-12-01 RX ORDER — HYDRALAZINE HYDROCHLORIDE 20 MG/ML
10 INJECTION INTRAMUSCULAR; INTRAVENOUS ONCE
Status: DISCONTINUED | OUTPATIENT
Start: 2018-12-01 | End: 2018-12-01

## 2018-12-01 RX ORDER — FENTANYL CITRATE 50 UG/ML
25 INJECTION, SOLUTION INTRAMUSCULAR; INTRAVENOUS
Status: DISCONTINUED | OUTPATIENT
Start: 2018-12-01 | End: 2018-12-01 | Stop reason: HOSPADM

## 2018-12-01 RX ORDER — SODIUM CHLORIDE 9 MG/ML
INJECTION, SOLUTION INTRAVENOUS
Status: DISCONTINUED | OUTPATIENT
Start: 2018-12-01 | End: 2018-12-01 | Stop reason: HOSPADM

## 2018-12-01 RX ORDER — PANTOPRAZOLE SODIUM 40 MG/10ML
40 INJECTION, POWDER, LYOPHILIZED, FOR SOLUTION INTRAVENOUS EVERY 12 HOURS
Status: DISCONTINUED | OUTPATIENT
Start: 2018-12-01 | End: 2018-12-01 | Stop reason: CLARIF

## 2018-12-01 RX ORDER — ROCURONIUM BROMIDE 10 MG/ML
INJECTION, SOLUTION INTRAVENOUS AS NEEDED
Status: DISCONTINUED | OUTPATIENT
Start: 2018-12-01 | End: 2018-12-01 | Stop reason: HOSPADM

## 2018-12-01 RX ORDER — SODIUM CHLORIDE 0.9 % (FLUSH) 0.9 %
5-10 SYRINGE (ML) INJECTION AS NEEDED
Status: DISCONTINUED | OUTPATIENT
Start: 2018-12-01 | End: 2018-12-01 | Stop reason: HOSPADM

## 2018-12-01 RX ORDER — LEVOTHYROXINE SODIUM 88 UG/1
88 TABLET ORAL
Status: DISCONTINUED | OUTPATIENT
Start: 2018-12-02 | End: 2018-12-02 | Stop reason: HOSPADM

## 2018-12-01 RX ORDER — OXYCODONE AND ACETAMINOPHEN 5; 325 MG/1; MG/1
1 TABLET ORAL AS NEEDED
Status: DISCONTINUED | OUTPATIENT
Start: 2018-12-01 | End: 2018-12-01 | Stop reason: HOSPADM

## 2018-12-01 RX ORDER — MORPHINE SULFATE 10 MG/ML
2 INJECTION, SOLUTION INTRAMUSCULAR; INTRAVENOUS
Status: DISCONTINUED | OUTPATIENT
Start: 2018-12-01 | End: 2018-12-01 | Stop reason: HOSPADM

## 2018-12-01 RX ORDER — HALOPERIDOL 5 MG/ML
1 INJECTION INTRAMUSCULAR
Status: DISCONTINUED | OUTPATIENT
Start: 2018-12-01 | End: 2018-12-02 | Stop reason: HOSPADM

## 2018-12-01 RX ORDER — ONDANSETRON 2 MG/ML
4 INJECTION INTRAMUSCULAR; INTRAVENOUS AS NEEDED
Status: DISCONTINUED | OUTPATIENT
Start: 2018-12-01 | End: 2018-12-01 | Stop reason: HOSPADM

## 2018-12-01 RX ORDER — SODIUM CHLORIDE 9 MG/ML
25 INJECTION, SOLUTION INTRAVENOUS CONTINUOUS
Status: DISCONTINUED | OUTPATIENT
Start: 2018-12-01 | End: 2018-12-01 | Stop reason: HOSPADM

## 2018-12-01 RX ORDER — ONDANSETRON 2 MG/ML
4 INJECTION INTRAMUSCULAR; INTRAVENOUS
Status: DISCONTINUED | OUTPATIENT
Start: 2018-12-01 | End: 2018-12-02 | Stop reason: HOSPADM

## 2018-12-01 RX ORDER — LIDOCAINE HYDROCHLORIDE 10 MG/ML
0.1 INJECTION, SOLUTION EPIDURAL; INFILTRATION; INTRACAUDAL; PERINEURAL AS NEEDED
Status: DISCONTINUED | OUTPATIENT
Start: 2018-12-01 | End: 2018-12-01 | Stop reason: HOSPADM

## 2018-12-01 RX ORDER — SUCRALFATE 1 G/10ML
1 SUSPENSION ORAL
Status: DISCONTINUED | OUTPATIENT
Start: 2018-12-01 | End: 2018-12-01

## 2018-12-01 RX ORDER — SODIUM CHLORIDE 9 MG/ML
75 INJECTION, SOLUTION INTRAVENOUS CONTINUOUS
Status: DISCONTINUED | OUTPATIENT
Start: 2018-12-01 | End: 2018-12-02 | Stop reason: HOSPADM

## 2018-12-01 RX ORDER — FENTANYL CITRATE 50 UG/ML
INJECTION, SOLUTION INTRAMUSCULAR; INTRAVENOUS AS NEEDED
Status: DISCONTINUED | OUTPATIENT
Start: 2018-12-01 | End: 2018-12-01 | Stop reason: HOSPADM

## 2018-12-01 RX ORDER — DEXAMETHASONE SODIUM PHOSPHATE 4 MG/ML
INJECTION, SOLUTION INTRA-ARTICULAR; INTRALESIONAL; INTRAMUSCULAR; INTRAVENOUS; SOFT TISSUE AS NEEDED
Status: DISCONTINUED | OUTPATIENT
Start: 2018-12-01 | End: 2018-12-01 | Stop reason: HOSPADM

## 2018-12-01 RX ORDER — LABETALOL HYDROCHLORIDE 5 MG/ML
INJECTION, SOLUTION INTRAVENOUS AS NEEDED
Status: DISCONTINUED | OUTPATIENT
Start: 2018-12-01 | End: 2018-12-01 | Stop reason: HOSPADM

## 2018-12-01 RX ORDER — SODIUM CHLORIDE 0.9 % (FLUSH) 0.9 %
5-10 SYRINGE (ML) INJECTION AS NEEDED
Status: DISCONTINUED | OUTPATIENT
Start: 2018-12-01 | End: 2018-12-02 | Stop reason: HOSPADM

## 2018-12-01 RX ORDER — MIDAZOLAM HYDROCHLORIDE 1 MG/ML
0.5 INJECTION, SOLUTION INTRAMUSCULAR; INTRAVENOUS
Status: DISCONTINUED | OUTPATIENT
Start: 2018-12-01 | End: 2018-12-01 | Stop reason: HOSPADM

## 2018-12-01 RX ORDER — MEMANTINE HYDROCHLORIDE 10 MG/1
10 TABLET ORAL 2 TIMES DAILY
Status: DISCONTINUED | OUTPATIENT
Start: 2018-12-01 | End: 2018-12-02 | Stop reason: HOSPADM

## 2018-12-01 RX ORDER — SODIUM CHLORIDE, SODIUM LACTATE, POTASSIUM CHLORIDE, CALCIUM CHLORIDE 600; 310; 30; 20 MG/100ML; MG/100ML; MG/100ML; MG/100ML
125 INJECTION, SOLUTION INTRAVENOUS CONTINUOUS
Status: DISCONTINUED | OUTPATIENT
Start: 2018-12-01 | End: 2018-12-01 | Stop reason: HOSPADM

## 2018-12-01 RX ORDER — SUCRALFATE 1 G/10ML
1 SUSPENSION ORAL
Status: DISCONTINUED | OUTPATIENT
Start: 2018-12-02 | End: 2018-12-02 | Stop reason: HOSPADM

## 2018-12-01 RX ORDER — PROPOFOL 10 MG/ML
INJECTION, EMULSION INTRAVENOUS AS NEEDED
Status: DISCONTINUED | OUTPATIENT
Start: 2018-12-01 | End: 2018-12-01 | Stop reason: HOSPADM

## 2018-12-01 RX ORDER — AMLODIPINE BESYLATE 5 MG/1
5 TABLET ORAL DAILY
Status: DISCONTINUED | OUTPATIENT
Start: 2018-12-02 | End: 2018-12-02 | Stop reason: HOSPADM

## 2018-12-01 RX ORDER — LIDOCAINE HYDROCHLORIDE 20 MG/ML
INJECTION, SOLUTION EPIDURAL; INFILTRATION; INTRACAUDAL; PERINEURAL AS NEEDED
Status: DISCONTINUED | OUTPATIENT
Start: 2018-12-01 | End: 2018-12-01 | Stop reason: HOSPADM

## 2018-12-01 RX ORDER — FEBUXOSTAT 40 MG/1
40 TABLET, FILM COATED ORAL DAILY
Status: DISCONTINUED | OUTPATIENT
Start: 2018-12-02 | End: 2018-12-02 | Stop reason: HOSPADM

## 2018-12-01 RX ORDER — SODIUM CHLORIDE 0.9 % (FLUSH) 0.9 %
5-10 SYRINGE (ML) INJECTION EVERY 8 HOURS
Status: DISCONTINUED | OUTPATIENT
Start: 2018-12-01 | End: 2018-12-01 | Stop reason: HOSPADM

## 2018-12-01 RX ADMIN — DEXAMETHASONE SODIUM PHOSPHATE 8 MG: 4 INJECTION, SOLUTION INTRA-ARTICULAR; INTRALESIONAL; INTRAMUSCULAR; INTRAVENOUS; SOFT TISSUE at 20:21

## 2018-12-01 RX ADMIN — PROPOFOL 50 MG: 10 INJECTION, EMULSION INTRAVENOUS at 20:19

## 2018-12-01 RX ADMIN — ROCURONIUM BROMIDE 5 MG: 10 INJECTION, SOLUTION INTRAVENOUS at 20:12

## 2018-12-01 RX ADMIN — LABETALOL HYDROCHLORIDE 5 MG: 5 INJECTION, SOLUTION INTRAVENOUS at 20:43

## 2018-12-01 RX ADMIN — LIDOCAINE HYDROCHLORIDE 60 MG: 20 INJECTION, SOLUTION EPIDURAL; INFILTRATION; INTRACAUDAL; PERINEURAL at 20:12

## 2018-12-01 RX ADMIN — SODIUM CHLORIDE: 9 INJECTION, SOLUTION INTRAVENOUS at 20:01

## 2018-12-01 RX ADMIN — ONDANSETRON 4 MG: 2 INJECTION INTRAMUSCULAR; INTRAVENOUS at 20:21

## 2018-12-01 RX ADMIN — SUCCINYLCHOLINE CHLORIDE 140 MG: 20 INJECTION INTRAMUSCULAR; INTRAVENOUS at 20:12

## 2018-12-01 RX ADMIN — Medication 10 ML: at 23:00

## 2018-12-01 RX ADMIN — FENTANYL CITRATE 25 MCG: 50 INJECTION, SOLUTION INTRAMUSCULAR; INTRAVENOUS at 20:29

## 2018-12-01 RX ADMIN — DONEPEZIL HYDROCHLORIDE 5 MG: 5 TABLET, FILM COATED ORAL at 23:10

## 2018-12-01 RX ADMIN — MEMANTINE HYDROCHLORIDE 10 MG: 10 TABLET ORAL at 23:23

## 2018-12-01 RX ADMIN — SODIUM CHLORIDE 40 MG: 9 INJECTION INTRAMUSCULAR; INTRAVENOUS; SUBCUTANEOUS at 21:40

## 2018-12-01 RX ADMIN — PROPOFOL 150 MG: 10 INJECTION, EMULSION INTRAVENOUS at 20:12

## 2018-12-01 RX ADMIN — FENTANYL CITRATE 25 MCG: 50 INJECTION, SOLUTION INTRAMUSCULAR; INTRAVENOUS at 20:12

## 2018-12-01 RX ADMIN — SODIUM CHLORIDE 25 ML/HR: 900 INJECTION, SOLUTION INTRAVENOUS at 20:00

## 2018-12-01 NOTE — ED TRIAGE NOTES
Pt arrives from home with family. Pt complains of being unable to \"swallow anything, if I do I puke it up\" Pt has dementia. Family states, \"if she swallows we can hear a bubbly noise and then a few minutes later she throws up. \" Pt also has slight drooling of saliva. Pt denies pain

## 2018-12-01 NOTE — ED PROVIDER NOTES
82 y.o. female with past medical history significant for Alzheimer's disease, colon CA, ulcerative colitis, nephritis and nephropathy, Grave's disease s/p LAZAR ablation, hypothyroidism, HTN, osteoporosis, anemia, gout, who presents ambulatory to the ED accompanied by family members, with chief complaint of dysphagia. Family reports that patient has had trouble swallowing and sore throat for at least 3 days. They state that cannot swallow food or liquids, and when she tries it \"gurgles in her throat\", she begins to cough and vomits it back up. Family notes that patient has also been drooling and has complained of right shoulder pain. They note that the patient has not complained of having anything stuck in her throat. Spouse reports that patient was calling for help in the middle of the night last night. He notes that patient was seen in the ED ~3 days ago for same symptoms, but states that the patient did not receive an XR. Family reports that patient has dementia, but deny any altered mental status from baseline. Pt specifically denies abdominal pain. There are no other acute medical concerns at this time. Full history, physical exam, and ROS unable to be obtained due to:  dementia. Social hx: Tobacco use (former smoker, quit date: 1/1/84, 0.25 packs/day for 20 years); Positive for EtOH use (occasionally); Negative for Illicit Drug use PCP: Mery Arizmendi MD 
 
Note written by Melo Berg, as dictated by Annabel Mejía MD 3:11 PM 
 
 
The history is provided by a relative, the spouse, medical records and the patient. The history is limited by the condition of the patient (Dementia). No  was used. Past Medical History:  
Diagnosis Date  Alzheimer's disease 7/19/2016  Anemia 10/10/2013  Asthma CHILDHOOD  Cataracts, both eyes  Colon cancer ([facility] [address] 7/09 Partial colectomy & chemo  Essential hypertension, benign 11/12/2010  Gout  Grave's disease   
 s/p LAZAR ablation.  Hypertension  Hypothyroidism 2011  Nephritis and nephropathy, not specified as acute or chronic, with unspecified pathological lesion in kidney H/O -- Unclear details  Osteoporosis  Ulcerative colitis Past Surgical History:  
Procedure Laterality Date  AMB POC GI TRACT CAPSULE ENDOSCOPY  10/31/2016  COLONOSCOPY N/A 2016 COLONOSCOPY performed by Lauro Garrido MD at [address] [facility] HX COLECTOMY  7 09  
 colon ca  HX GI    
 COLONOSCOPY YEARLY  
 HX HEENT    
 WISDOM TEETH  
 HX TONSILLECTOMY  UPPER GI ENDOSCOPY,BIOPSY  10/26/2016 Family History:  
Problem Relation Age of Onset  Hypertension Father  Stroke Father  Cancer Father   
     prostate  Kidney Disease Other \"nephritis\" Social History Socioeconomic History  Marital status:  Spouse name: Not on file  Number of children: Not on file  Years of education: Not on file  Highest education level: Not on file Social Needs  Financial resource strain: Not on file  Food insecurity - worry: Not on file  Food insecurity - inability: Not on file  Transportation needs - medical: Not on file  Transportation needs - non-medical: Not on file Occupational History  Not on file Tobacco Use  Smoking status: Former Smoker Packs/day: 0.25 Years: 20.00 Pack years: 5.00 Last attempt to quit: 1984 Years since quittin.9  Smokeless tobacco: Never Used Substance and Sexual Activity  Alcohol use: Yes Comment: OCCASIONAL WINE  
 Drug use: No  
 Sexual activity: Not on file Other Topics Concern  Not on file Social History Narrative  Not on file ALLERGIES: Actonel [risedronate]; Codeine; Pcn [penicillins]; and Sulfa (sulfonamide antibiotics) Review of Systems Unable to perform ROS: Dementia HENT: Positive for drooling, sore throat and trouble swallowing. Respiratory: Positive for cough. Gastrointestinal: Negative for abdominal pain. Musculoskeletal: Positive for arthralgias (right shoulder). Vitals:  
 12/01/18 1459 12/01/18 1510 BP:  163/66 Pulse: 77 75 Resp:  16 Temp:  97.7 °F (36.5 °C) SpO2: 99% 100% Weight:  70.8 kg (156 lb) Height:  5' 5\" (1.651 m) Physical Exam  
Constitutional: She appears well-developed and well-nourished. No distress. HENT:  
Head: Normocephalic and atraumatic. Given 1 oz of water, tried to swallow, immediately started choking and coughing. Not actively drooling saliva Eyes: Conjunctivae are normal. No scleral icterus. Neck: Neck supple. No tracheal deviation present. Cardiovascular: Normal rate, regular rhythm, normal heart sounds and intact distal pulses. Exam reveals no gallop and no friction rub. No murmur heard. Pulmonary/Chest: Effort normal and breath sounds normal. She has no wheezes. She has no rales. Abdominal: Soft. She exhibits no distension. There is no tenderness. There is no rebound and no guarding. Musculoskeletal: She exhibits no edema. Neurological: She is alert. She is disoriented (baseline). No new focal neurological deficits. Skin: Skin is warm and dry. No rash noted. Psychiatric: She has a normal mood and affect. Nursing note and vitals reviewed. Note written by Melo Laughlin, as dictated by Neo Farrell MD 3:11 PM 
 
[facility] Procedures PROGRESS NOTE: 
4:27 PM 
Radiology called. Imaging indicated \"roung, metallic\" foreign body in lower cervical esophagus. Will consult gastroenterology. CONSULT NOTE: 
5:02 PM Neo Farrell MD spoke with Dr. Shaquille Sebastian, Consult for Gastroenterology. Discussed available diagnostic tests and clinical findings. Dr. Shaquille Sebastian will talk to radiologist, Patient may need to go to the OR or ENT.  
 
CONSULT NOTE: 
 5:43 PM Abdiel Carlos MD spoke with Dr. Romeo Gilbert, Consult for ENT. Discussed available diagnostic tests and clinical findings. Dr. Romeo Gilbert will discuss with Dr. Rohan Lewis, Gastroenterology on next plan of action. PROGRESS NOTE: 
8:46 PM 
Dr. Rohan Lewis performed endoscopy and removed a quarter from patient's esophagus. Erosion noted around esophagus. Needs to be on carafate and Protonix IV, no pills PO. Admit to hospitalist. 
 
CONSULT NOTE: 
8:47 PM Abdiel aCrlos MD spoke with Dr. Yosef Colbert, Consult for Hospitalist via [facility]. Discussed available diagnostic tests and clinical findings. Dr. Karuna Buitrago will evaluate the patient for admission to the hospital. 
 
8:48 PM 
Patient is being admitted to the hospital.  The results of their tests and reasons for their admission have been discussed with them and/or available family. They convey agreement and understanding for the need to be admitted and for their admission diagnosis. Consultation will be made now with the inpatient physician for hospitalization.

## 2018-12-02 VITALS
TEMPERATURE: 98.2 F | RESPIRATION RATE: 16 BRPM | HEART RATE: 68 BPM | WEIGHT: 156 LBS | BODY MASS INDEX: 25.99 KG/M2 | DIASTOLIC BLOOD PRESSURE: 84 MMHG | HEIGHT: 65 IN | OXYGEN SATURATION: 98 % | SYSTOLIC BLOOD PRESSURE: 163 MMHG

## 2018-12-02 PROBLEM — T18.108A FOREIGN BODY IN ESOPHAGUS: Status: ACTIVE | Noted: 2018-12-02

## 2018-12-02 LAB
ALBUMIN SERPL-MCNC: 3 G/DL (ref 3.5–5)
ALBUMIN/GLOB SERPL: 0.7 {RATIO} (ref 1.1–2.2)
ALP SERPL-CCNC: 186 U/L (ref 45–117)
ALT SERPL-CCNC: 31 U/L (ref 12–78)
ANION GAP SERPL CALC-SCNC: 9 MMOL/L (ref 5–15)
AST SERPL-CCNC: 21 U/L (ref 15–37)
BASOPHILS # BLD: 0 K/UL (ref 0–0.1)
BASOPHILS NFR BLD: 0 % (ref 0–1)
BILIRUB SERPL-MCNC: 0.3 MG/DL (ref 0.2–1)
BUN SERPL-MCNC: 18 MG/DL (ref 6–20)
BUN/CREAT SERPL: 12 (ref 12–20)
CALCIUM SERPL-MCNC: 8.9 MG/DL (ref 8.5–10.1)
CHLORIDE SERPL-SCNC: 113 MMOL/L (ref 97–108)
CO2 SERPL-SCNC: 20 MMOL/L (ref 21–32)
CREAT SERPL-MCNC: 1.51 MG/DL (ref 0.55–1.02)
DIFFERENTIAL METHOD BLD: ABNORMAL
EOSINOPHIL # BLD: 0 K/UL (ref 0–0.4)
EOSINOPHIL NFR BLD: 0 % (ref 0–7)
ERYTHROCYTE [DISTWIDTH] IN BLOOD BY AUTOMATED COUNT: 12.7 % (ref 11.5–14.5)
GLOBULIN SER CALC-MCNC: 4.4 G/DL (ref 2–4)
GLUCOSE SERPL-MCNC: 166 MG/DL (ref 65–100)
HCT VFR BLD AUTO: 38.1 % (ref 35–47)
HGB BLD-MCNC: 12.4 G/DL (ref 11.5–16)
IMM GRANULOCYTES # BLD: 0.1 K/UL (ref 0–0.04)
IMM GRANULOCYTES NFR BLD AUTO: 1 % (ref 0–0.5)
LYMPHOCYTES # BLD: 0.5 K/UL (ref 0.8–3.5)
LYMPHOCYTES NFR BLD: 4 % (ref 12–49)
MAGNESIUM SERPL-MCNC: 2.1 MG/DL (ref 1.6–2.4)
MCH RBC QN AUTO: 31.6 PG (ref 26–34)
MCHC RBC AUTO-ENTMCNC: 32.5 G/DL (ref 30–36.5)
MCV RBC AUTO: 97.2 FL (ref 80–99)
MONOCYTES # BLD: 0.1 K/UL (ref 0–1)
MONOCYTES NFR BLD: 1 % (ref 5–13)
NEUTS SEG # BLD: 10.9 K/UL (ref 1.8–8)
NEUTS SEG NFR BLD: 94 % (ref 32–75)
NRBC # BLD: 0 K/UL (ref 0–0.01)
NRBC BLD-RTO: 0 PER 100 WBC
PHOSPHATE SERPL-MCNC: 3.7 MG/DL (ref 2.6–4.7)
PLATELET # BLD AUTO: 263 K/UL (ref 150–400)
PMV BLD AUTO: 10 FL (ref 8.9–12.9)
POTASSIUM SERPL-SCNC: 4.3 MMOL/L (ref 3.5–5.1)
PROT SERPL-MCNC: 7.4 G/DL (ref 6.4–8.2)
RBC # BLD AUTO: 3.92 M/UL (ref 3.8–5.2)
RBC MORPH BLD: ABNORMAL
SODIUM SERPL-SCNC: 142 MMOL/L (ref 136–145)
WBC # BLD AUTO: 11.6 K/UL (ref 3.6–11)

## 2018-12-02 PROCEDURE — 94760 N-INVAS EAR/PLS OXIMETRY 1: CPT

## 2018-12-02 PROCEDURE — 84100 ASSAY OF PHOSPHORUS: CPT

## 2018-12-02 PROCEDURE — 74011000250 HC RX REV CODE- 250: Performed by: SPECIALIST

## 2018-12-02 PROCEDURE — 74011250636 HC RX REV CODE- 250/636: Performed by: SPECIALIST

## 2018-12-02 PROCEDURE — 83735 ASSAY OF MAGNESIUM: CPT

## 2018-12-02 PROCEDURE — 85025 COMPLETE CBC W/AUTO DIFF WBC: CPT

## 2018-12-02 PROCEDURE — C9113 INJ PANTOPRAZOLE SODIUM, VIA: HCPCS | Performed by: SPECIALIST

## 2018-12-02 PROCEDURE — 99218 HC RM OBSERVATION: CPT

## 2018-12-02 PROCEDURE — 80053 COMPREHEN METABOLIC PANEL: CPT

## 2018-12-02 PROCEDURE — 36415 COLL VENOUS BLD VENIPUNCTURE: CPT

## 2018-12-02 PROCEDURE — 74011250637 HC RX REV CODE- 250/637: Performed by: HOSPITALIST

## 2018-12-02 RX ORDER — SUCRALFATE 1 G/10ML
1 SUSPENSION ORAL
Qty: 210 ML | Refills: 0 | Status: SHIPPED | OUTPATIENT
Start: 2018-12-02 | End: 2018-12-09

## 2018-12-02 RX ADMIN — SUCRALFATE 1 G: 1 SUSPENSION ORAL at 13:01

## 2018-12-02 RX ADMIN — SODIUM CHLORIDE 40 MG: 9 INJECTION INTRAMUSCULAR; INTRAVENOUS; SUBCUTANEOUS at 09:00

## 2018-12-02 RX ADMIN — Medication 10 ML: at 06:00

## 2018-12-02 NOTE — ANESTHESIA PREPROCEDURE EVALUATION
Anesthetic History No history of anesthetic complications Review of Systems / Medical History Patient summary reviewed, nursing notes reviewed and pertinent labs reviewed Pulmonary Within defined limits Neuro/Psych Within defined limits Cardiovascular Within defined limits Hypertension: well controlled Exercise tolerance: >4 METS 
  
GI/Hepatic/Renal 
Within defined limits Renal disease: CRI Endo/Other Hypothyroidism Arthritis, cancer and anemia Other Findings Comments: Alzheimer's disease, colon CA, ulcerative colitis, nephritis and nephropathy, Grave's disease s/p LAZAR ablation, hypothyroidism, HTN, osteoporosis, anemia, gout Physical Exam 
 
Airway Mallampati: III 
TM Distance: 4 - 6 cm Neck ROM: normal range of motion Mouth opening: Normal 
 
 Cardiovascular Regular rate and rhythm,  S1 and S2 normal,  no murmur, click, rub, or gallop Dental 
No notable dental hx Pulmonary Breath sounds clear to auscultation Abdominal 
GI exam deferred Other Findings Anesthetic Plan ASA: 3 Anesthesia type: general 
 
 
 
 
Induction: Intravenous Anesthetic plan and risks discussed with: Patient and Spouse

## 2018-12-02 NOTE — PROGRESS NOTES
Primary Nurse Ole Astorga RN and Keenan Dixon RN performed a dual skin assessment on this patient No impairment noted Rambo score is 23

## 2018-12-02 NOTE — PROCEDURES
[address] NAME:  Gretchen Roberson :   1935 MRN:   [id number] Date/Time:  2018 8:52 PMEsophagogastroduodenoscopy (EGD) Procedure Note :  Valentin Ormond, MD 
 
Referring Provider:  Riri Briceño MD 
 
Anethesia/Sedation:  MAC anesthesia Propofol Preoperative diagnosis: Foreign body in mouth, esophagus, and stomach, [T18. 0XXA, T18.108A, T18.2XXA] Postoperative diagnosis: foreign body esophagus Procedure Details After infom consent was obtained for the procedure, with all risks and benefits of procedure explained the patient was taken to the endoscopy suite and placed in the left lateral decubitus position. Following sequential administration of sedation as per above, the [id number] gastroscope was inserted into the mouth and advanced under direct vision to second portion of the duodenum. A careful inspection was made as the gastroscope was withdrawn, including a retroflexed view of the proximal stomach; findings and interventions are described below. Findings: 
Esophagus: A metallic coin (Quarter) was seen impacted at UES with ulceration at the site of impaction. [place] was disimpacted gently with Rat tooth forceps and pulled out. Food particles were pushed into the stomach. Random biopsies were obtained from mid and distal esophagus to rule out eosinophilic esophagitis. Stomach:normal  
Duodenum/jejunum:normal 
 
 
Therapies:  Esophageal foreign body removal 
 
Specimens: esophageal biopsies EBL: None Complications:   None; patient tolerated the procedure well. Impression:   
See Postoperative diagnosis above Recommendations: 
-Admit for overnight observation, clear liquids PO when awake and alert. IV PPI. Start Carafate in AM. Valentin Ormond, MD

## 2018-12-02 NOTE — PERIOP NOTES
TRANSFER - OUT REPORT: 
 
Verbal [facility] on Mat Graham  being transferred to 53(unit) for routine post - op Report consisted of patients Situation, Background, Assessment and  
Recommendations(SBAR). Time Pre op antibiotic given:N/A Anesthesia Stop time: 2047 [place]ave Present on Transfer to floor:no Order for Malave on Chart:no Discharge Prescriptions with Chart:no Information from the following report(s) SBAR, OR Summary, Intake/Output and MAR was reviewed with the receiving nurse. Opportunity for questions and clarification was provided. Is the patient on 02? NO 
     L/Min n/a Other n/a Is the patient on a monitor? NO Is the nurse transporting with the patient? YES Surgical Waiting Area notified of patient's transfer from PACU? YES The following personal items collected during your admission accompanied patient upon transfer:  
Dental Appliance:   
Vision: Visual Aid: None Hearing Aid:   
Jewelry:   
Clothing:  Pt's clothing & shoes sent home with family Other Valuables:   
Valuables sent to safe:

## 2018-12-02 NOTE — CONSULTS
Zachary Steiner MR#: [id number] : 1935 ACCOUNT #: [de-identified] DATE OF SERVICE: 2018 REASON FOR CONSULTATION:  Dr. Almonte Settler consulted us for dysphagia and inability to swallow. CHIEF COMPLAINT:  Difficulty swallowing for 3 days. HISTORY OF PRESENT ILLNESS:  The patient is an 56-year-old lady with a history of Alzheimer disease, colon cancer, ulcerative colitis, nephritis and nephropathy, Graves' disease, status post thyroid ablation, hypothyroidism, hypertension, anemia, and gout who presented to the emergency room with complaints of difficulty swallowing for the past 3 days. Apparently,  the patient has been complaining of sore throat and difficulty swallowing since Thursday. She came to the ER on Thursday and was evaluated and discharged. Her symptoms did not improve and she was unable to eat and she was unable to swallow liquids since yesterday. The family brought her back to the emergency room and she was evaluated and x-ray of the cervical spine and a CT scan of the chest revealed a rounded metallic foreign body at the C7 level and we were consulted for evaluation and management. PAST MEDICAL HISTORY:  Significant for dementia, colon cancer, ulcerative colitis, nephritis Graves' disease, hypothyroidism, hypertension, osteoporosis, anemia and gout. SOCIAL HISTORY:  She is an ex-smoker. She does not drink alcohol or use drugs. REVIEW OF SYSTEMS:  Unobtainable, as she has dementia. FAMILY HISTORY:  Her father had hypertension, stroke and prostate cancer. ALLERGIES:  ACTONEL, CODEINE AND SULFA. PHYSICAL EXAMINATION: 
GENERAL:  She is alert, appears to be comfortable. VITAL SIGNS:  Blood pressure 163/66, pulse of 75, respiratory rate of 16, temperature 97.7. HEENT:  Pupils are equal, react to light and accommodation. Ears normal. 
NECK:  Supple. There is no carotid bruit or jugular venous distention. CHEST:  Clear to auscultation. No crackles or wheeze. CARDIOVASCULAR:  Regular rate and rhythm. First and second sounds are normal.   
ABDOMEN:  Soft, nontender, normal active bowel sounds. There is no rigidity, no rebound. No palpable masses. CENTRAL NERVOUS SYSTEM:  She is alert. Her memory is poor. There is no focal neurological deficit. EXTREMITIES:  Negative for cyanosis, clubbing or edema. LABORATORY DATA:  White count is 12.0, hemoglobin of 13.2, platelets are 939. Sodium is 139, potassium is 4.1, chloride 110, BUN is 16, creatinine is 1.53. Chest x-ray and CT scan were reviewed with the radiologist and show a rounded metallic foreign body at C7 level, appears to be just below the upper esophageal sphincter. ASSESSMENT:  An 79-year-old lady has presented with a history of dysphagia for 3 days and inability to swallow anything for over 24 hours. Most likely this is secondary to impacted metallic foreign body at or just below the level of upper esophageal sphincter. I have discussed the case with Dr. Tanisha Russo and also with Dr. Narinder Ojeda and we plan to proceed with an upper endoscopy under general anesthesia with foreign body removal.  I have also discussed the procedure and potential benefits and risks including the possibility of bleeding, infection, allergic reaction to medicine, perforation and the risk of emergency surgery with the patient and the family in detail. They understand the risks and wished to proceed. RECOMMENDATIONS: 
1. Keep patient n.p.o. 
2.  IV fluids. 3.  Endoscopy with foreign body removal under general anesthesia. Dr. Narinder Ojeda is in agreement with the plan and will be available if needed. Thank you for consultation. Rubina Hernández MD 
  
 
PK / PN 
D: 12/01/2018 20:50 T: 12/01/2018 23:51 
JOB #: [id number] CC: Ana Luisa Royal MD 
CC: Laney Zambrano MD

## 2018-12-02 NOTE — PROGRESS NOTES
Patient examined, Xray and CT reviewed with radiologist. She presents with dysphagia and inability to swallow. CT shows foreign body at level of C 7. We will proceed with EGD/foreign body removal under GA. Discussed with Dr. Reddy Hernandez. He agrees with plan will be available if needed.

## 2018-12-02 NOTE — H&P
History & Physical 
 
Primary Care Provider: Charley Candelario MD 
Source of Information: ED physician History of Presenting Illness:  
Jennifer Sousa is a 82 y.o. female who presents with dysphagia and vomiting 82 y.o. female with past medical history significant for Alzheimer's disease, colon CA, ulcerative colitis, nephritis and nephropathy, Grave's disease s/p LAZAR ablation, hypothyroidism, HTN, osteoporosis, anemia, gout, who presents ambulatory to the ED accompanied by family members, with chief complaint of dysphagia. Family reports that patient has had trouble swallowing and sore throat for at least 3 days. They state that cannot swallow food or liquids, and when she tries it \"gurgles in her throat\", she begins to cough and vomits it back up. Family notes that patient has also been drooling and has complained of right shoulder pain. They note that the patient has not complained of having anything stuck in her throat. Spouse reports that patient was calling for help in the middle of the night last night. He notes that patient was seen in the ED ~3 days ago for same symptoms, but states that the patient did not receive an XR. Family reports that patient has dementia, but deny any altered mental status from baseline. Pt specifically denies abdominal pain. There are no other acute medical concerns at this time. Chest CT in ED noticed a round rounded metal in esophageal. Patient had urgent EGD by Dr. Leann Mathis, and found a quarter stuck at UES. Post procedure well admit overnight for observation. Review of Systems: No fever, no abd pain, no sob, Limited due to her dementia Past Medical History:  
Diagnosis Date  Alzheimer's disease 7/19/2016  Anemia 10/10/2013  Asthma CHILDHOOD  Cataracts, both eyes  Colon cancer ([facility] [address] 7/09 Partial colectomy & chemo  Essential hypertension, benign 11/12/2010  Gout  Grave's disease s/p LAZAR ablation.  Hypertension  Hypothyroidism 6/13/2011  Nephritis and nephropathy, not specified as acute or chronic, with unspecified pathological lesion in kidney H/O -- Unclear details  Osteoporosis  Ulcerative colitis Past Surgical History:  
Procedure Laterality Date  AMB POC GI TRACT CAPSULE ENDOSCOPY  10/31/2016  COLONOSCOPY N/A 8/24/2016 COLONOSCOPY performed by Jose Schulte MD at [address] HX COLECTOMY  7 09  
 colon ca  HX GI    
 COLONOSCOPY YEARLY  
 HX HEENT    
 WISDOM TEETH  
 HX TONSILLECTOMY  UPPER GI ENDOSCOPY,BIOPSY  10/26/2016 Prior to Admission medications Medication Sig Start Date End Date Taking? Authorizing Provider  
silver sulfADIAZINE (SILVADENE) 1 % topical cream Apply  to affected area two (2) times a day. 11/2/18   Charly Rosa MD  
cetirizine (ZYRTEC) 10 mg tablet Take 10 mg by mouth daily. Provider, Historical  
loperamide (IMODIUM) 2 mg capsule Take 2 mg by mouth as needed for Diarrhea. Provider, Historical  
amLODIPine (NORVASC) 5 mg tablet Take 5 mg by mouth daily. Provider, Historical  
febuxostat (ULORIC) 40 mg tab tablet Take 40 mg by mouth daily. Provider, Historical  
cyclobenzaprine (FLEXERIL) 5 mg tablet take 1 to 2 tablets by mouth three times a day if needed for MUSCLE SPASMS 11/16/16   Charly Rosa MD  
omeprazole (PRILOSEC) 20 mg capsule Take 1 Cap by mouth daily. Indications: Gastric Ulcer 10/26/16   Ryland Dunbar MD  
levothyroxine (SYNTHROID) 88 mcg tablet Take 88 mcg by mouth Daily (before breakfast). Provider, Historical  
memantine ER (NAMENDA XR) 28 mg capsule Take 1 Cap by mouth daily. 8/22/16   Charly Rosa MD  
donepezil (ARICEPT) 10 mg tablet TAKE 1 TABLET EACH NIGHT 6/8/16   Charly Rosa MD  
multivitamin (MULTIPLE VITAMIN) tablet Take  by mouth.  Takes one po once daily if remembered. Provider, Historical  
 
Allergies Allergen Reactions  Actonel [Risedronate] Other (comments) SEVERE HEARTBURN  
 Codeine Unknown (comments) Unsure but thinks it is nausea.  Pcn [Penicillins] Nausea and Vomiting  Sulfa (Sulfonamide Antibiotics) Nausea and Vomiting Family History Problem Relation Age of Onset  Hypertension Father  Stroke Father  Cancer Father   
     prostate  Kidney Disease Other \"nephritis\" SOCIAL HISTORY: 
Patient resides: 
Independently [facility]isted [facility] With family care x Smoking history:  
None x Former Chronic Alcohol history:  
None x Social   
Chronic Ambulates:  
Independently x  
w/cane   
w/walker   
w/wc CODE STATUS: 
DNR Full x Other Objective:  
 
Physical Exam:  
 
Visit Vitals BP (P) 167/52 Pulse 65 Temp 98.3 °F (36.8 °C) Resp 19 Ht 5' 5\" (1.651 m) Wt 70.8 kg (156 lb) SpO2 100% BMI 25.96 kg/m² O2 Flow Rate (L/min): (P) 2 l/min O2 Device: Nasal cannula General:  Alert, cooperative, no distress, appears stated age. Head:  Normocephalic, without obvious abnormality, atraumatic. Eyes:  Conjunctivae/corneas clear. PERRL, EOMs intact. Nose: Nares normal. Septum midline. Mucosa normal. No drainage or sinus tenderness. Throat: Lips, mucosa, and tongue normal. Teeth and gums normal.  
Neck: Supple, symmetrical, trachea midline, no adenopathy, thyroid: no enlargement/tenderness/nodules, no carotid bruit and no JVD. Back:   Symmetric, no curvature. ROM normal. No CVA tenderness. Lungs:   Clear to auscultation bilaterally. Chest wall:  No tenderness or deformity. Heart:  Regular rate and rhythm, S1, S2 normal, no murmur, click, rub or gallop. Abdomen:   Soft, non-tender. Bowel sounds normal. No masses,  No organomegaly. Extremities: Extremities normal, atraumatic, no cyanosis or edema. Pulses: 2+ and symmetric all extremities. Skin: Skin color, texture, turgor normal. No rashes or lesions Neurologic: CNII-XII intact. Normal sensation normal strength EKG:  NSR Data Review:  
 
Recent Days: 
Recent Labs 12/01/18 [address] WBC 12.0*  
HGB 13.2 HCT 41.7  Recent Labs 12/01/18 [address]   
K 4.1 * CO2 23 * BUN 16  
CREA 1.53* CA 9.8 ALB 3.7 SGOT 27 ALT 38 No results for input(s): PH, PCO2, PO2, HCO3, FIO2 in the last 72 hours. 24 Hour Results: 
Recent Results (from the past 24 hour(s)) SAMPLES BEING HELD Collection Time: 12/01/18  3:17 PM  
Result Value Ref Range SAMPLES BEING HELD 1RED 1BLU   
 COMMENT Add-on orders for these samples will be processed based on acceptable specimen integrity and analyte stability, which may vary by analyte. CBC WITH AUTOMATED DIFF Collection Time: 12/01/18  3:17 PM  
Result Value Ref Range WBC 12.0 (H) 3.6 - 11.0 K/uL  
 RBC 4.30 3.80 - 5.20 M/uL  
 HGB 13.2 11.5 - 16.0 g/dL HCT 41.7 35.0 - 47.0 % MCV 97.0 80.0 - 99.0 FL  
 MCH 30.7 26.0 - 34.0 PG  
 MCHC 31.7 30.0 - 36.5 g/dL  
 RDW 12.9 11.5 - 14.5 % PLATELET 803 944 - 484 K/uL MPV 9.8 8.9 - 12.9 FL  
 NRBC 0.0 0  WBC ABSOLUTE NRBC 0.00 0.00 - 0.01 K/uL NEUTROPHILS 82 (H) 32 - 75 % LYMPHOCYTES 9 (L) 12 - 49 % MONOCYTES 7 5 - 13 % EOSINOPHILS 1 0 - 7 % BASOPHILS 1 0 - 1 % IMMATURE GRANULOCYTES 1 (H) 0.0 - 0.5 % ABS. NEUTROPHILS 9.7 (H) 1.8 - 8.0 K/UL  
 ABS. LYMPHOCYTES 1.1 0.8 - 3.5 K/UL  
 ABS. MONOCYTES 0.9 0.0 - 1.0 K/UL  
 ABS. EOSINOPHILS 0.1 0.0 - 0.4 K/UL  
 ABS. BASOPHILS 0.1 0.0 - 0.1 K/UL  
 ABS. IMM. GRANS. 0.1 (H) 0.00 - 0.04 K/UL  
 DF AUTOMATED METABOLIC PANEL, COMPREHENSIVE Collection Time: 12/01/18  3:17 PM  
Result Value Ref Range Sodium 139 136 - 145 mmol/L Potassium 4.1 3.5 - 5.1 mmol/L Chloride 110 (H) 97 - 108 mmol/L  
 CO2 23 21 - 32 mmol/L  Anion gap 6 5 - 15 mmol/L  
 Glucose 119 (H) 65 - 100 mg/dL BUN 16 6 - 20 MG/DL Creatinine 1.53 (H) 0.55 - 1.02 MG/DL  
 BUN/Creatinine ratio 10 (L) 12 - 20 GFR est AA 39 (L) >60 ml/min/1.73m2 GFR est non-AA 33 (L) >60 ml/min/1.73m2 Calcium 9.8 8.5 - 10.1 MG/DL Bilirubin, total 0.4 0.2 - 1.0 MG/DL  
 ALT (SGPT) 38 12 - 78 U/L  
 AST (SGOT) 27 15 - 37 U/L Alk. phosphatase 220 (H) 45 - 117 U/L Protein, total 8.5 (H) 6.4 - 8.2 g/dL Albumin 3.7 3.5 - 5.0 g/dL Globulin 4.8 (H) 2.0 - 4.0 g/dL A-G Ratio 0.8 (L) 1.1 - 2.2 EGD:Esophagus: A metallic coin (Quarter) was seen impacted at UES with ulceration at the site of impaction. [place] was disimpacted gently with Rat tooth forceps and pulled out. Food particles were pushed into the stomach. Random biopsies were obtained from mid and distal esophagus to rule out eosinophilic esophagitis. Stomach:normal  
Duodenum/jejunum:normal 
  
 
 
 
Assessment:  
 
Active Problems: Dysphagia (12/1/2018) Plan: 1. Dysphagia due to Esophageal foreign body: s/p EGD and removed, noticed ulceration at UES due to the impaction. Continue iv PPI bid and Carafate per GI. Start clear liquid in am may advance. 2.[place]: due to vomiting. Continue IVF 3. Htn: resume home meds 4. Dementia: watch for acute delirium in hospital. Prn haldol if needed. Signed By: Glenny Hand MD   
 December 1, 2018

## 2018-12-02 NOTE — PROGRESS NOTES
TRANSFER - IN REPORT: 
 
Verbal report received from jean carlos(name) on Juan Bowen  being received from pacu(unit) for routine progression of care Report consisted of patients Situation, Background, Assessment and  
Recommendations(SBAR). Information from the following report(s) SBAR, MAR and Recent Results was reviewed with the receiving nurse. Opportunity for questions and clarification was provided. Assessment completed upon patients arrival to unit and care assumed.

## 2018-12-02 NOTE — ANESTHESIA POSTPROCEDURE EVALUATION
Post-Anesthesia Evaluation and Assessment Patient: Matti Chaudhry MRN: [id number]  SSN: [id number] YOB: 1935  Age: 82 y.o. Sex: female I have evaluated the patient and they are stable and ready for discharge from the PACU. Cardiovascular Function/Vital Signs Visit Vitals /83 Pulse 73 Temp 36.8 °C (98.3 °F) Resp 22 Ht 5' 5\" (1.651 m) Wt 70.8 kg (156 lb) SpO2 100% BMI 25.96 kg/m² Patient is status post General anesthesia for Procedure(s): ESOPHAGOGASTRODUODENOSCOPY (EGD). Nausea/Vomiting: None Postoperative hydration reviewed and adequate. Pain: 
Pain Scale 1: Numeric (0 - 10) (12/01/18 2045) Pain Intensity 1: 0 (12/01/18 2045) Managed Neurological Status:  
Neuro (WDL): Exceptions to [facility] (12/01/18 2045) Neuro Neurologic State: Drowsy; Eyes open to voice (12/01/18 2045) Orientation Level: Oriented X4 (12/01/18 1514) At baseline Mental Status, Level of Consciousness: Alert and  oriented to person, place, and time Pulmonary Status:  
O2 Device: Nasal cannula (12/01/18 2046) Adequate oxygenation and airway patent Complications related to anesthesia: None Post-anesthesia assessment completed. No concerns Signed By: Miguel De Guzman MD   
 December 1, 2018 Procedure(s): ESOPHAGOGASTRODUODENOSCOPY (EGD). <BSHSIANPOST> Visit Vitals /83 Pulse 73 Temp 36.8 °C (98.3 °F) Resp 22 Ht 5' 5\" (1.651 m) Wt 70.8 kg (156 lb) SpO2 100% BMI 25.96 kg/m²

## 2018-12-02 NOTE — PROGRESS NOTES
Jessy Samayoa, Rosangela Polanco Admit Date: 12/1/2018 Subjective:  
 
Events noted -- Quarter removed from esophagus! No new complaints. Current Facility-Administered Medications Medication Dose Route Frequency  amLODIPine (NORVASC) tablet 5 mg  5 mg Oral DAILY  donepezil (ARICEPT) tablet 5 mg  5 mg Oral QHS  febuxostat (ULORIC) tablet 40 mg  40 mg Oral DAILY  levothyroxine (SYNTHROID) tablet 88 mcg  88 mcg Oral ACB  memantine (NAMENDA) tablet 10 mg  10 mg Oral BID  sodium chloride (NS) flush 5-10 mL  5-10 mL IntraVENous Q8H  
 sodium chloride (NS) flush 5-10 mL  5-10 mL IntraVENous PRN  
 naloxone (NARCAN) injection 0.4 mg  0.4 mg IntraVENous PRN  
 zolpidem (AMBIEN) tablet 5 mg  5 mg Oral QHS PRN  
 sucralfate (CARAFATE) 100 mg/mL oral suspension 1 g  1 g Oral TIDAC  ondansetron (ZOFRAN) injection 4 mg  4 mg IntraVENous Q4H PRN  
 haloperidol lactate (HALDOL) injection 1 mg  1 mg IntraVENous Q8H PRN  pantoprazole (PROTONIX) 40 mg in sodium chloride 0.9% 10 mL injection  40 mg IntraVENous Q12H  
 0.9% sodium chloride infusion  75 mL/hr IntraVENous CONTINUOUS Objective:  
 
Patient Vitals for the past 8 hrs: 
 BP Temp Pulse Resp SpO2  
12/02/18 0814 163/84 98.2 °F (36.8 °C) 68 16 98 % 12/02/18 0350 148/71 98.1 °F (36.7 °C) 69 16 97 % No intake/output data recorded. 11/30 1901 - 12/02 0700 In: 700 [I.V.:700] Out: 1 Physical Exam: NAD. Alert, baseline dementia. Neck -- Supple. No JVD. Heart -- RRR. Lungs -- CTA. Abd -- Benign. Ext -- No LE edema, b/l. Data Review Recent Results (from the past 24 hour(s)) SAMPLES BEING HELD Collection Time: 12/01/18  3:17 PM  
Result Value Ref Range SAMPLES BEING HELD 1RED 1BLU   
 COMMENT Add-on orders for these samples will be processed based on acceptable specimen integrity and analyte stability, which may vary by analyte. CBC WITH AUTOMATED DIFF Collection Time: 12/01/18  3:17 PM  
Result Value Ref Range WBC 12.0 (H) 3.6 - 11.0 K/uL  
 RBC 4.30 3.80 - 5.20 M/uL  
 HGB 13.2 11.5 - 16.0 g/dL HCT 41.7 35.0 - 47.0 % MCV 97.0 80.0 - 99.0 FL  
 MCH 30.7 26.0 - 34.0 PG  
 MCHC 31.7 30.0 - 36.5 g/dL  
 RDW 12.9 11.5 - 14.5 % PLATELET 131 042 - 323 K/uL MPV 9.8 8.9 - 12.9 FL  
 NRBC 0.0 0  WBC ABSOLUTE NRBC 0.00 0.00 - 0.01 K/uL NEUTROPHILS 82 (H) 32 - 75 % LYMPHOCYTES 9 (L) 12 - 49 % MONOCYTES 7 5 - 13 % EOSINOPHILS 1 0 - 7 % BASOPHILS 1 0 - 1 % IMMATURE GRANULOCYTES 1 (H) 0.0 - 0.5 % ABS. NEUTROPHILS 9.7 (H) 1.8 - 8.0 K/UL  
 ABS. LYMPHOCYTES 1.1 0.8 - 3.5 K/UL  
 ABS. MONOCYTES 0.9 0.0 - 1.0 K/UL  
 ABS. EOSINOPHILS 0.1 0.0 - 0.4 K/UL  
 ABS. BASOPHILS 0.1 0.0 - 0.1 K/UL  
 ABS. IMM. GRANS. 0.1 (H) 0.00 - 0.04 K/UL  
 DF AUTOMATED METABOLIC PANEL, COMPREHENSIVE Collection Time: 12/01/18  3:17 PM  
Result Value Ref Range Sodium 139 136 - 145 mmol/L Potassium 4.1 3.5 - 5.1 mmol/L Chloride 110 (H) 97 - 108 mmol/L  
 CO2 23 21 - 32 mmol/L Anion gap 6 5 - 15 mmol/L Glucose 119 (H) 65 - 100 mg/dL BUN 16 6 - 20 MG/DL Creatinine 1.53 (H) 0.55 - 1.02 MG/DL  
 BUN/Creatinine ratio 10 (L) 12 - 20 GFR est AA 39 (L) >60 ml/min/1.73m2 GFR est non-AA 33 (L) >60 ml/min/1.73m2 Calcium 9.8 8.5 - 10.1 MG/DL Bilirubin, total 0.4 0.2 - 1.0 MG/DL  
 ALT (SGPT) 38 12 - 78 U/L  
 AST (SGOT) 27 15 - 37 U/L Alk. phosphatase 220 (H) 45 - 117 U/L Protein, total 8.5 (H) 6.4 - 8.2 g/dL Albumin 3.7 3.5 - 5.0 g/dL Globulin 4.8 (H) 2.0 - 4.0 g/dL A-G Ratio 0.8 (L) 1.1 - 2.2    
CBC WITH AUTOMATED DIFF Collection Time: 12/02/18  4:40 AM  
Result Value Ref Range WBC 11.6 (H) 3.6 - 11.0 K/uL  
 RBC 3.92 3.80 - 5.20 M/uL HGB 12.4 11.5 - 16.0 g/dL HCT 38.1 35.0 - 47.0 % MCV 97.2 80.0 - 99.0 FL  
 MCH 31.6 26.0 - 34.0 PG  
 MCHC 32.5 30.0 - 36.5 g/dL  
 RDW 12.7 11.5 - 14.5 % PLATELET 129 466 - 603 K/uL MPV 10.0 8.9 - 12.9 FL  
 NRBC 0.0 0  WBC ABSOLUTE NRBC 0.00 0.00 - 0.01 K/uL NEUTROPHILS 94 (H) 32 - 75 % LYMPHOCYTES 4 (L) 12 - 49 % MONOCYTES 1 (L) 5 - 13 % EOSINOPHILS 0 0 - 7 % BASOPHILS 0 0 - 1 % IMMATURE GRANULOCYTES 1 (H) 0.0 - 0.5 % ABS. NEUTROPHILS 10.9 (H) 1.8 - 8.0 K/UL  
 ABS. LYMPHOCYTES 0.5 (L) 0.8 - 3.5 K/UL  
 ABS. MONOCYTES 0.1 0.0 - 1.0 K/UL  
 ABS. EOSINOPHILS 0.0 0.0 - 0.4 K/UL  
 ABS. BASOPHILS 0.0 0.0 - 0.1 K/UL  
 ABS. IMM. GRANS. 0.1 (H) 0.00 - 0.04 K/UL  
 DF SMEAR SCANNED    
 RBC COMMENTS NORMOCYTIC, NORMOCHROMIC METABOLIC PANEL, COMPREHENSIVE Collection Time: 12/02/18  4:40 AM  
Result Value Ref Range Sodium 142 136 - 145 mmol/L Potassium 4.3 3.5 - 5.1 mmol/L Chloride 113 (H) 97 - 108 mmol/L  
 CO2 20 (L) 21 - 32 mmol/L Anion gap 9 5 - 15 mmol/L Glucose 166 (H) 65 - 100 mg/dL BUN 18 6 - 20 MG/DL Creatinine 1.51 (H) 0.55 - 1.02 MG/DL  
 BUN/Creatinine ratio 12 12 - 20 GFR est AA 40 (L) >60 ml/min/1.73m2 GFR est non-AA 33 (L) >60 ml/min/1.73m2 Calcium 8.9 8.5 - 10.1 MG/DL Bilirubin, total 0.3 0.2 - 1.0 MG/DL  
 ALT (SGPT) 31 12 - 78 U/L  
 AST (SGOT) 21 15 - 37 U/L Alk. phosphatase 186 (H) 45 - 117 U/L Protein, total 7.4 6.4 - 8.2 g/dL Albumin 3.0 (L) 3.5 - 5.0 g/dL Globulin 4.4 (H) 2.0 - 4.0 g/dL A-G Ratio 0.7 (L) 1.1 - 2.2 MAGNESIUM Collection Time: 12/02/18  4:40 AM  
Result Value Ref Range Magnesium 2.1 1.6 - 2.4 mg/dL PHOSPHORUS Collection Time: 12/02/18  4:40 AM  
Result Value Ref Range Phosphorus 3.7 2.6 - 4.7 MG/DL Assessment:  
 
Principal Problem: 
  Foreign body in esophagus -- Removed endoscopically. Active Problems: 
  Essential hypertension, benign (11/12/2010) Alzheimer's disease (7/19/2016) Plan: 1. Pt swallowed water without problem -- Regular diet. Assuming she does OK with this, will discharge her home this afternoon. 2. 1 week of Carafate Rx'ed. D/w .  
 
 
 
Peggy Goff MD

## 2018-12-02 NOTE — PROGRESS NOTES
Primary Nurse Dalia Jackson RN and Edmond RN performed a dual skin assessment on this patient No impairment noted Rambo score is 21

## 2019-01-15 PROBLEM — T18.108A FOREIGN BODY IN ESOPHAGUS: Status: RESOLVED | Noted: 2018-12-02 | Resolved: 2019-01-15

## (undated) DEVICE — FORCEPS BX L240CM JAW DIA2.8MM L CAP W/ NDL MIC MESH TOOTH

## (undated) DEVICE — (D)FCPS GRSP 9MM 230CMLX2.4MM -- DISC BY MFR